# Patient Record
Sex: FEMALE | Race: WHITE | HISPANIC OR LATINO | Employment: OTHER | URBAN - METROPOLITAN AREA
[De-identification: names, ages, dates, MRNs, and addresses within clinical notes are randomized per-mention and may not be internally consistent; named-entity substitution may affect disease eponyms.]

---

## 2018-02-21 ENCOUNTER — OFFICE VISIT (OUTPATIENT)
Dept: PRIMARY CARE CLINIC | Facility: CLINIC | Age: 28
End: 2018-02-21
Payer: MEDICAID

## 2018-02-21 VITALS
RESPIRATION RATE: 18 BRPM | OXYGEN SATURATION: 99 % | TEMPERATURE: 98 F | BODY MASS INDEX: 43.78 KG/M2 | WEIGHT: 223 LBS | HEIGHT: 60 IN | SYSTOLIC BLOOD PRESSURE: 99 MMHG | HEART RATE: 53 BPM | DIASTOLIC BLOOD PRESSURE: 68 MMHG

## 2018-02-21 DIAGNOSIS — S16.1XXA STRAIN OF NECK MUSCLE, INITIAL ENCOUNTER: Primary | ICD-10-CM

## 2018-02-21 DIAGNOSIS — I95.9 HYPOTENSION, UNSPECIFIED HYPOTENSION TYPE: ICD-10-CM

## 2018-02-21 DIAGNOSIS — M62.9 MUSCULOSKELETAL DISORDER INVOLVING UPPER TRAPEZIUS MUSCLE: ICD-10-CM

## 2018-02-21 DIAGNOSIS — E66.3 OVERWEIGHT: ICD-10-CM

## 2018-02-21 DIAGNOSIS — J45.909 ASTHMA, UNSPECIFIED ASTHMA SEVERITY, UNSPECIFIED WHETHER COMPLICATED, UNSPECIFIED WHETHER PERSISTENT: ICD-10-CM

## 2018-02-21 PROCEDURE — 3008F BODY MASS INDEX DOCD: CPT | Mod: ,,, | Performed by: FAMILY MEDICINE

## 2018-02-21 PROCEDURE — 99999 PR PBB SHADOW E&M-NEW PATIENT-LVL IV: CPT | Mod: PBBFAC,,, | Performed by: FAMILY MEDICINE

## 2018-02-21 PROCEDURE — 99204 OFFICE O/P NEW MOD 45 MIN: CPT | Mod: PBBFAC,PN | Performed by: FAMILY MEDICINE

## 2018-02-21 PROCEDURE — 99203 OFFICE O/P NEW LOW 30 MIN: CPT | Mod: S$PBB,,, | Performed by: FAMILY MEDICINE

## 2018-02-21 RX ORDER — DICLOFENAC SODIUM 75 MG/1
75 TABLET, DELAYED RELEASE ORAL 2 TIMES DAILY
Qty: 60 TABLET | Refills: 3 | Status: SHIPPED | OUTPATIENT
Start: 2018-02-21 | End: 2018-04-05

## 2018-02-21 RX ORDER — METHYLPREDNISOLONE 4 MG/1
TABLET ORAL
Qty: 1 PACKAGE | Refills: 0 | Status: SHIPPED | OUTPATIENT
Start: 2018-02-21 | End: 2018-04-05

## 2018-02-21 RX ORDER — CYCLOBENZAPRINE HCL 10 MG
10 TABLET ORAL 3 TIMES DAILY PRN
Qty: 30 TABLET | Refills: 5 | Status: SHIPPED | OUTPATIENT
Start: 2018-02-21 | End: 2018-03-03

## 2018-02-21 NOTE — PROGRESS NOTES
Subjective:       Patient ID: Rivka Moreau is a 28 y.o. female.    Chief Complaint: Neck Pain (want blood work and BP check )    HPI: 27 yo WF c/o pain left side neck. Started yesterday AM --woke up with stiff neck. Went to gym to lift weighs after waking up with sore neck thinks may have made it worse . No hx trauma no excessive activity + hx heartburn. No lupus, rheumatoid arthritis, gout, fx or arthritis.     ROS:  Skin: no psoriasis, eczema, skin cancer tatooes on arms   HEENT: + headache only left side head , no  ocular pain, blurred vision, diplopia, epistaxis, hoarseness change in voice, thyroid trouble  Lung: No pneumonia,+ asthma has inhaler, no  Tb, wheezing, SOB,  Heart: No chest pain, ankle edema, palpitations, MI, nathan murmur, hypertension, hyperlipidemia-- has feeling BP low checked with BP machine [facility] [place] 90/52. When goes long time without eating get hypoglycemic and BP goes down.   Abdomen: No nausea, vomiting, diarrhea, constipation, ulcers, hepatitis, gallbladder disease, melena, hematochezia, hematemesis  : no UTI, renal disease, stones  MS: no fractures, O/A, lupus, rheumatoid, gout  Neuro: No dizziness, LOC, seizures   No diabetes, no anemia, + anxiety pt does not want to take anxiety medication, no depression  Engaged , 2 children , lives with fiance and 2 children work [employer]      Objective:   Physical Exam:  General: Well nourished, well developed, no acute distress + overweight  Skin: tatooes on forearms bilat   HEENT: Eyes PERRLA, EOM intact, nose patent, throat non-erythematous   NECK: Supple, no bruits, No JVD, no nodes  Lungs: Clear, no rales, rhonchi, wheezing  Heart: Regular rate and rhythm, no murmurs, gallops, or rubs  Abdomen: flat, bowel sounds positive, no tenderness, or organomegaly  MS: Tenderness cervical spine C3-7 and left upper trapezius muscle on palpation.  Pain with lateral flexion and rotation to the right with spasm of the left paravertebral muscles  of left upper trapezius muscle.  Pain with raising arms overhead but full range of motion muscle strength  Neuro: Alert, CN intact, oriented X 3  Extremities: No cyanosis, clubbing, or edema         Assessment:       1. Strain of neck muscle, initial encounter    2. Musculoskeletal disorder involving upper trapezius muscle    3. Hypotension, unspecified hypotension type    4. Overweight    5. Asthma, unspecified asthma severity, unspecified whether complicated, unspecified whether persistent        Plan:       Strain of neck muscle, initial encounter  -     X-Ray Cervical Spine Complete 5 view; Future; Expected date: 02/21/2018    Musculoskeletal disorder involving upper trapezius muscle    Hypotension, unspecified hypotension type  -     CBC auto differential; Future; Expected date: 02/21/2018  -     Comprehensive metabolic panel; Future; Expected date: 02/21/2018  -     EKG 12-lead; Future  -     Lipid panel; Future; Expected date: 02/21/2018  -     X-Ray Chest PA And Lateral; Future; Expected date: 02/21/2018  -     Urinalysis  -     T4, free; Future; Expected date: 02/21/2018  -     TSH; Future; Expected date: 02/21/2018    Overweight  -     CBC auto differential; Future; Expected date: 02/21/2018  -     Comprehensive metabolic panel; Future; Expected date: 02/21/2018  -     EKG 12-lead; Future  -     Lipid panel; Future; Expected date: 02/21/2018  -     X-Ray Chest PA And Lateral; Future; Expected date: 02/21/2018  -     Urinalysis  -     T4, free; Future; Expected date: 02/21/2018  -     TSH; Future; Expected date: 02/21/2018    Asthma, unspecified asthma severity, unspecified whether complicated, unspecified whether persistent    Other orders  -     methylPREDNISolone (MEDROL DOSEPACK) 4 mg tablet; use as directed  Dispense: 1 Package; Refill: 0  -     cyclobenzaprine (FLEXERIL) 10 MG tablet; Take 1 tablet (10 mg total) by mouth 3 (three) times daily as needed.  Dispense: 30 tablet; Refill: 5  -      diclofenac (VOLTAREN) 75 MG EC tablet; Take 1 tablet (75 mg total) by mouth 2 (two) times daily.  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

## 2018-03-06 ENCOUNTER — CLINICAL SUPPORT (OUTPATIENT)
Dept: PRIMARY CARE CLINIC | Facility: CLINIC | Age: 28
End: 2018-03-06
Payer: MEDICAID

## 2018-03-06 DIAGNOSIS — I95.9 HYPOTENSION, UNSPECIFIED HYPOTENSION TYPE: ICD-10-CM

## 2018-03-06 DIAGNOSIS — E66.3 OVERWEIGHT: ICD-10-CM

## 2018-03-06 LAB
ALBUMIN SERPL BCP-MCNC: 3.6 G/DL
ALP SERPL-CCNC: 80 U/L
ALT SERPL W/O P-5'-P-CCNC: 8 U/L
ANION GAP SERPL CALC-SCNC: 6 MMOL/L
AST SERPL-CCNC: 12 U/L
BASOPHILS # BLD AUTO: 0.03 K/UL
BASOPHILS NFR BLD: 0.4 %
BILIRUB SERPL-MCNC: 0.3 MG/DL
BUN SERPL-MCNC: 11 MG/DL
CALCIUM SERPL-MCNC: 9 MG/DL
CHLORIDE SERPL-SCNC: 109 MMOL/L
CHOLEST SERPL-MCNC: 133 MG/DL
CHOLEST/HDLC SERPL: 2.2 {RATIO}
CO2 SERPL-SCNC: 26 MMOL/L
CREAT SERPL-MCNC: 0.8 MG/DL
DIFFERENTIAL METHOD: NORMAL
EOSINOPHIL # BLD AUTO: 0.4 K/UL
EOSINOPHIL NFR BLD: 5 %
ERYTHROCYTE [DISTWIDTH] IN BLOOD BY AUTOMATED COUNT: 13.8 %
EST. GFR  (AFRICAN AMERICAN): >60 ML/MIN/1.73 M^2
EST. GFR  (NON AFRICAN AMERICAN): >60 ML/MIN/1.73 M^2
GLUCOSE SERPL-MCNC: 95 MG/DL
HCT VFR BLD AUTO: 39.9 %
HDLC SERPL-MCNC: 61 MG/DL
HDLC SERPL: 45.9 %
HGB BLD-MCNC: 13.2 G/DL
IMM GRANULOCYTES # BLD AUTO: 0.03 K/UL
IMM GRANULOCYTES NFR BLD AUTO: 0.4 %
LDLC SERPL CALC-MCNC: 58.6 MG/DL
LYMPHOCYTES # BLD AUTO: 2.1 K/UL
LYMPHOCYTES NFR BLD: 24.2 %
MCH RBC QN AUTO: 29.5 PG
MCHC RBC AUTO-ENTMCNC: 33.1 G/DL
MCV RBC AUTO: 89 FL
MONOCYTES # BLD AUTO: 0.6 K/UL
MONOCYTES NFR BLD: 6.4 %
NEUTROPHILS # BLD AUTO: 5.4 K/UL
NEUTROPHILS NFR BLD: 63.6 %
NONHDLC SERPL-MCNC: 72 MG/DL
NRBC BLD-RTO: 0 /100 WBC
PLATELET # BLD AUTO: 203 K/UL
PMV BLD AUTO: 12.3 FL
POTASSIUM SERPL-SCNC: 4.4 MMOL/L
PROT SERPL-MCNC: 7 G/DL
RBC # BLD AUTO: 4.48 M/UL
SODIUM SERPL-SCNC: 141 MMOL/L
T4 FREE SERPL-MCNC: 0.97 NG/DL
TRIGL SERPL-MCNC: 67 MG/DL
TSH SERPL DL<=0.005 MIU/L-ACNC: 1.26 UIU/ML
WBC # BLD AUTO: 8.54 K/UL

## 2018-03-06 PROCEDURE — 85025 COMPLETE CBC W/AUTO DIFF WBC: CPT

## 2018-03-06 PROCEDURE — 80053 COMPREHEN METABOLIC PANEL: CPT

## 2018-03-06 PROCEDURE — 99999 PR PBB SHADOW E&M-EST. PATIENT-LVL II: CPT | Mod: PBBFAC,,,

## 2018-03-06 PROCEDURE — 84439 ASSAY OF FREE THYROXINE: CPT

## 2018-03-06 PROCEDURE — 99212 OFFICE O/P EST SF 10 MIN: CPT | Mod: PBBFAC,PN

## 2018-03-06 PROCEDURE — 80061 LIPID PANEL: CPT

## 2018-03-06 PROCEDURE — 84443 ASSAY THYROID STIM HORMONE: CPT

## 2018-03-06 NOTE — PROGRESS NOTES
Pt ID verified by Name and . EKG done per MD order. Pt tolerated well. Result signed off by Dr. Reyes.

## 2018-04-05 ENCOUNTER — OFFICE VISIT (OUTPATIENT)
Dept: PRIMARY CARE CLINIC | Facility: CLINIC | Age: 28
End: 2018-04-05
Payer: MEDICAID

## 2018-04-05 VITALS
RESPIRATION RATE: 18 BRPM | BODY MASS INDEX: 44.37 KG/M2 | DIASTOLIC BLOOD PRESSURE: 65 MMHG | TEMPERATURE: 98 F | SYSTOLIC BLOOD PRESSURE: 96 MMHG | HEIGHT: 60 IN | OXYGEN SATURATION: 100 % | WEIGHT: 226 LBS | HEART RATE: 62 BPM

## 2018-04-05 DIAGNOSIS — J45.909 ASTHMA, UNSPECIFIED ASTHMA SEVERITY, UNSPECIFIED WHETHER COMPLICATED, UNSPECIFIED WHETHER PERSISTENT: Primary | ICD-10-CM

## 2018-04-05 DIAGNOSIS — I95.9 HYPOTENSION, UNSPECIFIED HYPOTENSION TYPE: ICD-10-CM

## 2018-04-05 DIAGNOSIS — J32.9 SINUSITIS, UNSPECIFIED CHRONICITY, UNSPECIFIED LOCATION: ICD-10-CM

## 2018-04-05 DIAGNOSIS — E66.3 OVERWEIGHT: ICD-10-CM

## 2018-04-05 DIAGNOSIS — J40 BRONCHITIS: ICD-10-CM

## 2018-04-05 PROCEDURE — 99213 OFFICE O/P EST LOW 20 MIN: CPT | Mod: S$PBB,,, | Performed by: FAMILY MEDICINE

## 2018-04-05 PROCEDURE — 99213 OFFICE O/P EST LOW 20 MIN: CPT | Mod: PBBFAC,PN | Performed by: FAMILY MEDICINE

## 2018-04-05 PROCEDURE — 99999 PR PBB SHADOW E&M-EST. PATIENT-LVL III: CPT | Mod: PBBFAC,,, | Performed by: FAMILY MEDICINE

## 2018-04-05 RX ORDER — PROMETHAZINE HYDROCHLORIDE AND CODEINE PHOSPHATE 6.25; 1 MG/5ML; MG/5ML
5 SOLUTION ORAL EVERY 6 HOURS PRN
Qty: 180 ML | Refills: 0 | Status: SHIPPED | OUTPATIENT
Start: 2018-04-05 | End: 2018-08-16

## 2018-04-05 RX ORDER — METHYLPREDNISOLONE 4 MG/1
TABLET ORAL
Qty: 1 PACKAGE | Refills: 0 | Status: SHIPPED | OUTPATIENT
Start: 2018-04-05 | End: 2018-04-26

## 2018-04-05 RX ORDER — AZITHROMYCIN 250 MG/1
TABLET, FILM COATED ORAL
Qty: 6 TABLET | Refills: 0 | Status: SHIPPED | OUTPATIENT
Start: 2018-04-05 | End: 2018-04-09

## 2018-04-05 RX ORDER — ALBUTEROL SULFATE 90 UG/1
2 AEROSOL, METERED RESPIRATORY (INHALATION) EVERY 4 HOURS PRN
Qty: 1 INHALER | Refills: 5 | Status: SHIPPED | OUTPATIENT
Start: 2018-04-05 | End: 2018-08-16

## 2018-04-05 NOTE — PROGRESS NOTES
Subjective:       Patient ID: Rivka Moreau is a 28 y.o. female.    Chief Complaint: Sore Throat and Headache    HPI:29 yo WF in for cold -- sick x 2 days-- no fever, + runny nose, + sor eth, + cough, +phklkegm -- green, alot. + asthma + wheezing inhaler , No P.TB, No smoking   ROS:  Skin: no psoriasis, eczema, skin cancer  HEENT: + headache frontal , no  ocular pain, blurred vision, diplopia, epistaxis,+ hoarseness+ change in voice,no  thyroid trouble  Lung: No pneumonia,  Tb, +wheezing, SOB, + asthma, no smoking  Heart: No chest pain, ankle edema, palpitations, MI, nathan murmur, hypertension, hyperlipidemia-- hx hyopotension  Abdomen: No nausea, vomiting, diarrhea, constipation, ulcers, hepatitis, gallbladder disease, melena, hematochezia, hematemesis  : no UTI, renal disease, stones   Gyn LMP 3 weeks PAP last year   MS: no fractures, O/A, lupus, rheumatoid, gout  Neuro: No dizziness, LOC, seizures   No diabetes, no anemia, no anxiety, no depression   Engaged, 2 children, 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 Asthma, unspecified asthma severity, unspecified whether complicated, unspecified whether persistent    2. Bronchitis    3. Sinusitis, unspecified chronicity, unspecified location    4. Hypotension, unspecified hypotension type    5. Overweight        Plan:       Asthma, unspecified asthma severity, unspecified  whether complicated, unspecified whether persistent    Bronchitis    Sinusitis, unspecified chronicity, unspecified location    Hypotension, unspecified hypotension type    Overweight      Pt with asthma needs new inhaler  Cold tx zith, medrol, phenergan with codiene

## 2018-08-16 ENCOUNTER — OFFICE VISIT (OUTPATIENT)
Dept: PRIMARY CARE CLINIC | Facility: CLINIC | Age: 28
End: 2018-08-16
Payer: MEDICAID

## 2018-08-16 ENCOUNTER — TELEPHONE (OUTPATIENT)
Dept: PRIMARY CARE CLINIC | Facility: CLINIC | Age: 28
End: 2018-08-16

## 2018-08-16 VITALS
SYSTOLIC BLOOD PRESSURE: 107 MMHG | BODY MASS INDEX: 43.78 KG/M2 | WEIGHT: 223 LBS | DIASTOLIC BLOOD PRESSURE: 66 MMHG | OXYGEN SATURATION: 56 % | HEIGHT: 60 IN | RESPIRATION RATE: 18 BRPM | HEART RATE: 56 BPM

## 2018-08-16 DIAGNOSIS — S16.1XXA STRAIN OF NECK MUSCLE, INITIAL ENCOUNTER: ICD-10-CM

## 2018-08-16 DIAGNOSIS — J45.909 ASTHMA, UNSPECIFIED ASTHMA SEVERITY, UNSPECIFIED WHETHER COMPLICATED, UNSPECIFIED WHETHER PERSISTENT: ICD-10-CM

## 2018-08-16 DIAGNOSIS — M62.9 MUSCULOSKELETAL DISORDER INVOLVING UPPER TRAPEZIUS MUSCLE: Primary | ICD-10-CM

## 2018-08-16 DIAGNOSIS — F41.9 ANXIETY: ICD-10-CM

## 2018-08-16 DIAGNOSIS — E66.01 MORBID OBESITY WITH BMI OF 40.0-44.9, ADULT: ICD-10-CM

## 2018-08-16 PROBLEM — E66.3 OVERWEIGHT: Status: RESOLVED | Noted: 2018-02-21 | Resolved: 2018-08-16

## 2018-08-16 PROCEDURE — 99213 OFFICE O/P EST LOW 20 MIN: CPT | Mod: S$PBB,,, | Performed by: FAMILY MEDICINE

## 2018-08-16 PROCEDURE — 99999 PR PBB SHADOW E&M-EST. PATIENT-LVL III: CPT | Mod: PBBFAC,,, | Performed by: FAMILY MEDICINE

## 2018-08-16 PROCEDURE — 99213 OFFICE O/P EST LOW 20 MIN: CPT | Mod: PBBFAC,PN | Performed by: FAMILY MEDICINE

## 2018-08-16 RX ORDER — LORAZEPAM 0.5 MG/1
TABLET ORAL
Qty: 30 TABLET | Refills: 1 | Status: SHIPPED | OUTPATIENT
Start: 2018-08-16 | End: 2019-01-02

## 2018-08-16 RX ORDER — METHYLPREDNISOLONE 4 MG/1
TABLET ORAL
Qty: 1 PACKAGE | Refills: 0 | Status: SHIPPED | OUTPATIENT
Start: 2018-08-16 | End: 2018-09-06

## 2018-08-16 RX ORDER — ALBUTEROL SULFATE 90 UG/1
2 AEROSOL, METERED RESPIRATORY (INHALATION) EVERY 4 HOURS PRN
Qty: 1 INHALER | Refills: 5 | Status: SHIPPED | OUTPATIENT
Start: 2018-08-16 | End: 2019-01-02

## 2018-08-16 RX ORDER — IBUPROFEN 600 MG/1
600 TABLET ORAL 3 TIMES DAILY
Qty: 60 TABLET | Refills: 5 | Status: SHIPPED | OUTPATIENT
Start: 2018-08-16 | End: 2019-01-02

## 2018-08-16 RX ORDER — AZITHROMYCIN 250 MG/1
TABLET, FILM COATED ORAL
Qty: 6 TABLET | Refills: 0 | Status: SHIPPED | OUTPATIENT
Start: 2018-08-16 | End: 2018-08-20

## 2018-08-16 RX ORDER — BETAMETHASONE SODIUM PHOSPHATE AND BETAMETHASONE ACETATE 3; 3 MG/ML; MG/ML
12 INJECTION, SUSPENSION INTRA-ARTICULAR; INTRALESIONAL; INTRAMUSCULAR; SOFT TISSUE
Status: DISCONTINUED | OUTPATIENT
Start: 2018-08-16 | End: 2019-01-02

## 2018-08-16 RX ORDER — PROPRANOLOL HYDROCHLORIDE 10 MG/1
TABLET ORAL
Qty: 60 TABLET | Refills: 5 | Status: SHIPPED | OUTPATIENT
Start: 2018-08-16 | End: 2019-01-02

## 2018-08-16 NOTE — PROGRESS NOTES
Patient ID by name and . Celestone 12 mg IM injection ordered per physicians order. Patient refused.

## 2018-08-16 NOTE — TELEPHONE ENCOUNTER
----- Message from Romi Birmingham sent at 8/16/2018 10:16 AM CDT -----  Type:  Same Day Appointment Request    Caller is requesting a same day appointment.  Caller declined first available appointment listed below.      Name of Caller:  Patient   When is the first available appointment?  08/17/18  Symptoms:  Check bp states 138/94 today  Best Call Back Number:  [phone]  Additional Information:

## 2018-08-16 NOTE — PROGRESS NOTES
Subjective:       Patient ID: Rivka Moreau is a 28 y.o. female.    Chief Complaint: Hypertension (138/94)    HPI:  28-year-old female in for high blood pressure--blood pressure 138/94--patient was seen at the chiropractor today--problem with left upper trapezius muscle--patient is a hairdresser.  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 and has to raise arms up to work on scalp cyst has to keep elbows laterally and extended and aggravates in the neck patient has only had low blood pressure in the past       Patient with a history of hoarseness/slight wheezing/felt was secondary to allergy    ROS:  Skin: no psoriasis, eczema, skin cancer   HEENT: No headache, ocular pain, blurred vision, diplopia, epistaxis, +hoarsenessno  change in voice, thyroid trouble  Lung: No pneumonia, asthma, Tb, + slight wheezing, SOB,  Heart: No chest pain, ankle edema, palpitations, MI, nathan murmur, hypertension, hyperlipidemia--blood pressure has always been low  Abdomen: No nausea, vomiting, diarrhea, constipation, ulcers, hepatitis, gallbladder disease, melena, hematochezia, hematemesis  : no UTI, renal disease, stones  MS: no fractures, O/A, lupus, rheumatoid, gout + left upper trapezius pain  Neuro: No dizziness, LOC, seizures   No diabetes, no anemia, +anxiety--occasionally with work-mainly work, no depression   , 2 children, 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 anxiety with standing in front classes--heart starts beating fast, handshake---mother with rheumatoid arthritis, father diabetes, OK with in laws, finances okay, sex OK Brother could be more responsible Pt worries about him    Objective:   Physical Exam:  General: Well nourished, well developed, no acute distress + obese   Skin: No lesions  HEENT: Eyes PERRLA, EOM intact, nose patent, throat non-erythematous    NECK: Supple, no bruits, No JVD, no nodes  Lungs: Clear, no rales, rhonchi, wheezing  Heart: Regular rate and rhythm, no  murmurs, gallops, or rubs  Abdomen: flat, bowel sounds positive, no tenderness, or organomegaly  MS:  Tenderness in cervical spine C3 through 7 left upper trapezius muscle with palpation pain with lateral flexion rotation of the neck with raising arms overhead--main problem is patient works with arms at about 90° elbows and flexion with hands 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 Musculoskeletal disorder involving upper trapezius muscle    2. Strain of neck muscle, initial encounter    3. Anxiety    4. Morbid obesity with BMI of 40.0-44.9, adult    5. Asthma, unspecified asthma severity, unspecified whether complicated, unspecified whether persistent        Plan:       Musculoskeletal disorder involving upper trapezius muscle    Strain of neck muscle, initial encounter    Anxiety    Morbid obesity with BMI of 40.0-44.9, adult    Asthma, unspecified asthma severity, unspecified whether complicated, unspecified whether persistent      patient needs to get an arm blood pressure cuff check blood pressure daily--if blood pressure greater than 140 systolic or 90 diastolic call for blood pressure medications  Due to difficulty speaking in front of people history of questionable hypertension and anxiety issues with palpitations will give trial of Inderal 10 mg once if blood pressure stable will increase to b.i.d. patient should use arm blood pressure cuff if blood pressure less than 100 systolic over 60 diastolic knee not take Inderal will try to increase dose to about 60 mg per day if possible for maximum effect on palpitations anxiety and States frieght  Patient needs to exercise decrease weight has morbid obesity should consider possibly gastric sleeve if unable to lose weight with diet and exercise  Has asthma/hoarseness--Zithromax  Due to pain in the neck and shoulder trial of  Edith own/Medrol/ibuprofen  If wheezing needs albuterol inhaler  Anxiety trial of Ativan 0.5 once a day p.r.n. anxiety but may be able to use just the Inderal  LMP 2 weeks ago  Zithromax Celestone in Medrol for cold, Celestone Medrol ibuprofen for neck and shoulder, albuterol for wheezing, Ativan for anxiety, Inderal for palpitations anxiety stage fright blood pressure needs arm blood pressure cuff

## 2018-11-09 LAB — HIV 1+2 AB+HIV1 P24 AG SERPL QL IA: NEGATIVE

## 2019-01-02 ENCOUNTER — OFFICE VISIT (OUTPATIENT)
Dept: PRIMARY CARE CLINIC | Facility: CLINIC | Age: 29
End: 2019-01-02
Payer: MEDICAID

## 2019-01-02 VITALS
RESPIRATION RATE: 18 BRPM | SYSTOLIC BLOOD PRESSURE: 107 MMHG | HEART RATE: 69 BPM | OXYGEN SATURATION: 98 % | BODY MASS INDEX: 33.19 KG/M2 | WEIGHT: 219 LBS | DIASTOLIC BLOOD PRESSURE: 72 MMHG | HEIGHT: 68 IN

## 2019-01-02 DIAGNOSIS — I95.9 HYPOTENSION, UNSPECIFIED HYPOTENSION TYPE: ICD-10-CM

## 2019-01-02 DIAGNOSIS — J40 BRONCHITIS: ICD-10-CM

## 2019-01-02 DIAGNOSIS — M25.511 ACUTE PAIN OF RIGHT SHOULDER: ICD-10-CM

## 2019-01-02 DIAGNOSIS — J32.9 SINUSITIS, UNSPECIFIED CHRONICITY, UNSPECIFIED LOCATION: Primary | ICD-10-CM

## 2019-01-02 DIAGNOSIS — J45.909 ASTHMA, UNSPECIFIED ASTHMA SEVERITY, UNSPECIFIED WHETHER COMPLICATED, UNSPECIFIED WHETHER PERSISTENT: ICD-10-CM

## 2019-01-02 DIAGNOSIS — I45.10 INCOMPLETE RIGHT BUNDLE BRANCH BLOCK: ICD-10-CM

## 2019-01-02 PROBLEM — M47.816 OSTEOARTHRITIS OF LUMBAR SPINE: Status: ACTIVE | Noted: 2019-01-02

## 2019-01-02 PROCEDURE — 99213 PR OFFICE/OUTPT VISIT, EST, LEVL III, 20-29 MIN: ICD-10-PCS | Mod: S$PBB,,, | Performed by: FAMILY MEDICINE

## 2019-01-02 PROCEDURE — 99999 PR PBB SHADOW E&M-EST. PATIENT-LVL III: ICD-10-PCS | Mod: PBBFAC,,, | Performed by: FAMILY MEDICINE

## 2019-01-02 PROCEDURE — 99999 PR PBB SHADOW E&M-EST. PATIENT-LVL III: CPT | Mod: PBBFAC,,, | Performed by: FAMILY MEDICINE

## 2019-01-02 PROCEDURE — 99213 OFFICE O/P EST LOW 20 MIN: CPT | Mod: S$PBB,,, | Performed by: FAMILY MEDICINE

## 2019-01-02 PROCEDURE — 99213 OFFICE O/P EST LOW 20 MIN: CPT | Mod: PBBFAC,PN | Performed by: FAMILY MEDICINE

## 2019-01-02 RX ORDER — AZITHROMYCIN 250 MG/1
TABLET, FILM COATED ORAL
Qty: 6 TABLET | Refills: 0 | Status: SHIPPED | OUTPATIENT
Start: 2019-01-02 | End: 2019-01-06

## 2019-01-02 RX ORDER — METHYLPREDNISOLONE 4 MG/1
TABLET ORAL
Qty: 1 PACKAGE | Refills: 0 | Status: SHIPPED | OUTPATIENT
Start: 2019-01-02 | End: 2019-04-10

## 2019-01-02 RX ORDER — CYCLOBENZAPRINE HCL 10 MG
10 TABLET ORAL 3 TIMES DAILY PRN
Qty: 30 TABLET | Refills: 5 | Status: SHIPPED | OUTPATIENT
Start: 2019-01-02 | End: 2019-01-12

## 2019-01-02 RX ORDER — PROMETHAZINE HYDROCHLORIDE AND CODEINE PHOSPHATE 6.25; 1 MG/5ML; MG/5ML
5 SOLUTION ORAL EVERY 6 HOURS PRN
Qty: 180 ML | Refills: 0 | Status: SHIPPED | OUTPATIENT
Start: 2019-01-02 | End: 2019-04-10

## 2019-01-02 RX ORDER — IBUPROFEN 600 MG/1
TABLET ORAL
Qty: 100 TABLET | Refills: 5 | Status: SHIPPED | OUTPATIENT
Start: 2019-01-02 | End: 2019-04-10

## 2019-01-02 RX ORDER — ALBUTEROL SULFATE 90 UG/1
2 AEROSOL, METERED RESPIRATORY (INHALATION) EVERY 4 HOURS PRN
Qty: 1 INHALER | Refills: 5 | Status: SHIPPED | OUTPATIENT
Start: 2019-01-02 | End: 2019-04-10

## 2019-01-02 RX ORDER — BETAMETHASONE SODIUM PHOSPHATE AND BETAMETHASONE ACETATE 3; 3 MG/ML; MG/ML
12 INJECTION, SUSPENSION INTRA-ARTICULAR; INTRALESIONAL; INTRAMUSCULAR; SOFT TISSUE
Status: DISCONTINUED | OUTPATIENT
Start: 2019-01-02 | End: 2019-04-10

## 2019-01-02 NOTE — PROGRESS NOTES
Subjective:       Patient ID: Rivka Moreau is a 28 y.o. female.    Chief Complaint: Cough (congestion); Back Pain; and Sore Throat    HPI:27 yo WF in for cold -- sick x 2 days-- no fever, + runny nose, + sore th, + cough, + phlegm -- green and clear--+hoarseness + asthma + wheezing has been using inhaler No P.TB, No smoking       Complaining of back pain---cardiology took an x-ray of the patient's back--history of hypotension.  Patient had an echocardiogram/24 Holter -no stress test--EKG showed an incomplete right bundle branch block x-ray of the lumbar spine showed osteoarthritis right L5-S1 area.  Burning sensation about the in for scapular area probably T8.  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 probable irritating the left scapula at the in for scapular border could be affecting the trapezius muscle or the latissimus dorsi or rhomboid muscles on the left in for scapular area around T8   ROS:  Skin: no psoriasis, eczema, skin cancer  HEENT: + headache frontal , no  ocular pain, blurred vision, diplopia, epistaxis,+ hoarseness+ change in voice,no  thyroid trouble  Lung: No pneumonia,  Tb, +wheezing, SOB, + asthma, no smoking  Heart: No chest pain, ankle edema, palpitations, MI, nathan murmur, hypertension, hyperlipidemia-- hx hyopotension  Abdomen: No nausea, vomiting, diarrhea, constipation, ulcers, hepatitis, gallbladder disease, melena, hematochezia, hematemesis  : no UTI, renal disease, stones   Gyn LMP 3 weeks PAP last year   MS: no fractures, O/A, lupus, rheumatoid, gout  Neuro: No dizziness, LOC, seizures   No diabetes, no anemia, no anxiety, no depression   Engaged, 2 children, 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     Objective:   Physical Exam:  General: Well nourished, well developed, no acute distress + overweight   Skin: No lesions  HEENT: Eyes PERRLA, EOM intact, nose cl D/C , throat  +1/4 erythematous ears TM cl   NECK: Supple, no bruits, No JVD, no  nodes  Lungs: Clear, no rales, rhonchi, wheezing +coarse cough  Heart: Regular rate and rhythm, no murmurs, gallops, or rubs  Abdomen: flat, bowel sounds positive, no tenderness, or organomegaly  MS: Range of motion and muscle strength intact --tenderness left scapular area especially the in for scapular portion around T8 pain with anterior posterior flexion shoulders pain with raising arms overhead pain in the left upper trapezius lateral flexion neck to the right sore with raising arms overhead  Neuro: Alert, CN intact, oriented X 3  Extremities: No cyanosis, clubbing, or edema         Assessment:       1. Sinusitis, unspecified chronicity, unspecified location    2. Bronchitis    3. Hypotension, unspecified hypotension type    4. Asthma, unspecified asthma severity, unspecified whether complicated, unspecified whether persistent        Plan:       Sinusitis, unspecified chronicity, unspecified location    Bronchitis    Hypotension, unspecified hypotension type    Asthma, unspecified asthma severity, unspecified whether complicated, unspecified whether persistent       Cold tx zith/Medrol/Phenergan with coat/ celestone   Use albuterol inhaler p.r.n.  Ibuprofen/Flexeril once off steroids--Moist heat/range of motion exercise/Theragesic  If pain stays x-ray T-spine in left shoulder--patient however is  pain appears to be musculoskeletal--states does appear to occur in the bra line--may benefit from an athletic bra

## 2019-04-10 ENCOUNTER — OFFICE VISIT (OUTPATIENT)
Dept: PRIMARY CARE CLINIC | Facility: CLINIC | Age: 29
End: 2019-04-10
Payer: MEDICAID

## 2019-04-10 ENCOUNTER — TELEPHONE (OUTPATIENT)
Dept: PRIMARY CARE CLINIC | Facility: CLINIC | Age: 29
End: 2019-04-10

## 2019-04-10 VITALS
SYSTOLIC BLOOD PRESSURE: 105 MMHG | BODY MASS INDEX: 33.49 KG/M2 | OXYGEN SATURATION: 99 % | HEART RATE: 73 BPM | RESPIRATION RATE: 18 BRPM | HEIGHT: 68 IN | TEMPERATURE: 98 F | WEIGHT: 221 LBS | DIASTOLIC BLOOD PRESSURE: 72 MMHG

## 2019-04-10 DIAGNOSIS — J45.909 ASTHMA, UNSPECIFIED ASTHMA SEVERITY, UNSPECIFIED WHETHER COMPLICATED, UNSPECIFIED WHETHER PERSISTENT: ICD-10-CM

## 2019-04-10 DIAGNOSIS — J32.9 SINUSITIS, UNSPECIFIED CHRONICITY, UNSPECIFIED LOCATION: Primary | ICD-10-CM

## 2019-04-10 DIAGNOSIS — J98.01 BRONCHOSPASM: ICD-10-CM

## 2019-04-10 DIAGNOSIS — J40 BRONCHITIS: ICD-10-CM

## 2019-04-10 DIAGNOSIS — I45.10 INCOMPLETE RIGHT BUNDLE BRANCH BLOCK: ICD-10-CM

## 2019-04-10 DIAGNOSIS — I95.9 HYPOTENSION, UNSPECIFIED HYPOTENSION TYPE: ICD-10-CM

## 2019-04-10 DIAGNOSIS — E66.9 CLASS 1 OBESITY WITH BODY MASS INDEX (BMI) OF 33.0 TO 33.9 IN ADULT, UNSPECIFIED OBESITY TYPE, UNSPECIFIED WHETHER SERIOUS COMORBIDITY PRESENT: ICD-10-CM

## 2019-04-10 PROBLEM — E66.811 CLASS 1 OBESITY WITH BODY MASS INDEX (BMI) OF 33.0 TO 33.9 IN ADULT: Status: ACTIVE | Noted: 2019-04-10

## 2019-04-10 LAB
CTP QC/QA: YES
FLUAV AG NPH QL: NEGATIVE
FLUBV AG NPH QL: NEGATIVE

## 2019-04-10 PROCEDURE — 99214 OFFICE O/P EST MOD 30 MIN: CPT | Mod: S$PBB,,, | Performed by: FAMILY MEDICINE

## 2019-04-10 PROCEDURE — 96372 THER/PROPH/DIAG INJ SC/IM: CPT | Mod: PBBFAC,PN

## 2019-04-10 PROCEDURE — 99999 PR PBB SHADOW E&M-EST. PATIENT-LVL III: CPT | Mod: PBBFAC,,, | Performed by: FAMILY MEDICINE

## 2019-04-10 PROCEDURE — 99213 OFFICE O/P EST LOW 20 MIN: CPT | Mod: PBBFAC,PN | Performed by: FAMILY MEDICINE

## 2019-04-10 PROCEDURE — 99999 PR PBB SHADOW E&M-EST. PATIENT-LVL III: ICD-10-PCS | Mod: PBBFAC,,, | Performed by: FAMILY MEDICINE

## 2019-04-10 PROCEDURE — 99214 PR OFFICE/OUTPT VISIT, EST, LEVL IV, 30-39 MIN: ICD-10-PCS | Mod: S$PBB,,, | Performed by: FAMILY MEDICINE

## 2019-04-10 PROCEDURE — 87804 INFLUENZA ASSAY W/OPTIC: CPT | Mod: PBBFAC,PN | Performed by: FAMILY MEDICINE

## 2019-04-10 RX ORDER — PROMETHAZINE HYDROCHLORIDE AND CODEINE PHOSPHATE 6.25; 1 MG/5ML; MG/5ML
5 SOLUTION ORAL EVERY 6 HOURS PRN
Qty: 180 ML | Refills: 0 | OUTPATIENT
Start: 2019-04-10 | End: 2020-11-23

## 2019-04-10 RX ORDER — ALBUTEROL SULFATE 90 UG/1
2 AEROSOL, METERED RESPIRATORY (INHALATION) EVERY 4 HOURS PRN
Qty: 1 INHALER | Refills: 5 | Status: SHIPPED | OUTPATIENT
Start: 2019-04-10 | End: 2020-02-18 | Stop reason: SDUPTHER

## 2019-04-10 RX ORDER — METHYLPREDNISOLONE 4 MG/1
TABLET ORAL
Qty: 1 PACKAGE | Refills: 0 | OUTPATIENT
Start: 2019-04-10 | End: 2020-11-23

## 2019-04-10 RX ORDER — AZITHROMYCIN 250 MG/1
TABLET, FILM COATED ORAL
Qty: 6 TABLET | Refills: 0 | Status: SHIPPED | OUTPATIENT
Start: 2019-04-10 | End: 2019-04-14

## 2019-04-10 RX ORDER — TRIAMCINOLONE ACETONIDE 40 MG/ML
40 INJECTION, SUSPENSION INTRA-ARTICULAR; INTRAMUSCULAR ONCE
Status: COMPLETED | OUTPATIENT
Start: 2019-04-10 | End: 2019-04-10

## 2019-04-10 RX ADMIN — TRIAMCINOLONE ACETONIDE 40 MG: 400 INJECTION, SUSPENSION INTRA-ARTICULAR; INTRAMUSCULAR at 11:04

## 2019-04-10 NOTE — PROGRESS NOTES
Subjective:       Patient ID: Rivka Moreau is a 29 y.o. female.    Chief Complaint: Sore Throat; Fever; and Otalgia    HPI:28 yo WF in for cold -- sick x 2 days-- + subjective fever-chills, + runny nose-more congested, + sore th, + cough, + phlegm -- green and clear- + asthma + wheezing especially at night --had an inhaler but out No P.TB, No smoking        Patient just back from [facility] went to hair coloring convention  ROS:  Skin: no psoriasis, eczema, skin cancer  HEENT: + headache frontal , no  ocular pain, blurred vision, diplopia, epistaxis,+ hoarseness+ change in voice,no  thyroid trouble  Lung: No pneumonia,  Tb, +wheezing, SOB, + asthma, no smoking  Heart: No chest pain, ankle edema, palpitations, MI, nathan murmur, hypertension, hyperlipidemia-- hx hyopotension--doing OK   Abdomen: No nausea, vomiting, diarrhea, constipation, ulcers, hepatitis, gallbladder disease, melena, hematochezia, hematemesis  : no UTI, renal disease, stones   Gyn LMP now  PAP 2 mo ago   MS: no fractures, O/A, lupus, rheumatoid, gout  Neuro: No dizziness, LOC, seizures   No diabetes, no anemia, + anxiety, no depression   Engaged, 2 children, 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     Objective:   Physical Exam:  General: Well nourished, well developed, no acute distress + overweight   Skin: No lesions  HEENT: Eyes PERRLA, EOM intact, nose cl D/C , throat  +1/4 erythematous ears TM cl   NECK: Supple, no bruits, No JVD, no nodes  Lungs: Clear, no rales, rhonchi, wheezing +coarse cough  Heart: Regular rate and rhythm, no murmurs, gallops, or rubs  Abdomen: flat, bowel sounds positive, no tenderness, or organomegaly  MS: Range of motion and muscle strength intact --tenderness left scapular area especially the in for scapular portion around T8 pain with anterior posterior flexion shoulders pain with raising arms overhead pain in the left upper trapezius lateral flexion neck to the right sore with raising arms  overhead  Neuro: Alert, CN intact, oriented X 3  Extremities: No cyanosis, clubbing, or edema         Assessment:       1. Sinusitis, unspecified chronicity, unspecified location    2. Bronchitis    3. Bronchospasm    4. Asthma, unspecified asthma severity, unspecified whether complicated, unspecified whether persistent    5. Incomplete right bundle branch block    6. Hypotension, unspecified hypotension type    7. Class 1 obesity with body mass index (BMI) of 33.0 to 33.9 in adult, unspecified obesity type, unspecified whether serious comorbidity present        Plan:       Sinusitis, unspecified chronicity, unspecified location  -     POCT INFLUENZA A/B    Bronchitis  -     POCT INFLUENZA A/B    Bronchospasm  -     POCT INFLUENZA A/B    Asthma, unspecified asthma severity, unspecified whether complicated, unspecified whether persistent  -     CBC auto differential; Future; Expected date: 04/10/2019  -     Comprehensive metabolic panel; Future; Expected date: 04/10/2019  -     Fecal Immunochemical Test (iFOBT); Future; Expected date: 04/10/2019  -     Lipid panel; Future; Expected date: 04/10/2019  -     X-Ray Chest PA And Lateral; Future; Expected date: 04/10/2019  -     POCT urine dipstick without microscope  -     T4, free; Future; Expected date: 04/10/2019  -     TSH; Future; Expected date: 04/10/2019    Incomplete right bundle branch block    Hypotension, unspecified hypotension type  -     CBC auto differential; Future; Expected date: 04/10/2019  -     Comprehensive metabolic panel; Future; Expected date: 04/10/2019  -     Fecal Immunochemical Test (iFOBT); Future; Expected date: 04/10/2019  -     Lipid panel; Future; Expected date: 04/10/2019  -     X-Ray Chest PA And Lateral; Future; Expected date: 04/10/2019  -     POCT urine dipstick without microscope  -     T4, free; Future; Expected date: 04/10/2019  -     TSH; Future; Expected date: 04/10/2019    Class 1 obesity with body mass index (BMI) of 33.0 to  33.9 in adult, unspecified obesity type, unspecified whether serious comorbidity present    Other orders  -     triamcinolone acetonide injection 40 mg  -     azithromycin (Z-CARLY) 250 MG tablet; 2 tabs by mouth day 1, then 1 tab by mouth daily x 4 days  Dispense: 6 tablet; Refill: 0  -     promethazine-codeine 6.25-10 mg/5 ml (PHENERGAN WITH CODEINE) 6.25-10 mg/5 mL syrup; Take 5 mLs by mouth every 6 (six) hours as needed for Cough.  Dispense: 180 mL; Refill: 0  -     methylPREDNISolone (MEDROL DOSEPACK) 4 mg tablet; use as directed  Dispense: 1 Package; Refill: 0  -     albuterol (PROVENTIL/VENTOLIN HFA) 90 mcg/actuation inhaler; Inhale 2 puffs into the lungs every 4 (four) hours as needed for Wheezing or Shortness of Breath. Rescue  Dispense: 1 Inhaler; Refill: 5       Cold Kenalog zith/Medrol/Phenergan with cod  Obesity--exercise/decrease weight  Routine lab CBCs CMP lipids T4 TSH UA chest x-ray no need for EKG was done recently   Use albuterol inhaler p.r.n.

## 2019-04-10 NOTE — PROGRESS NOTES
Patient ID verified by name and . NKDA. Kenalog 40 mg IM injection given in right ventrogluteal using aseptic technique. Aspirated with no blood noted. Patient tolerated well. Given per physicians order. No adverse reaction noted.

## 2019-04-10 NOTE — TELEPHONE ENCOUNTER
----- Message from Emily Corrigan sent at 4/10/2019  9:18 AM CDT -----  Contact: Mother  Type:  Same Day Appointment Request    Caller is requesting a same day appointment.  Caller declined first available appointment listed below.      Name of Caller:  Kim mother  When is the first available appointment?  04/25/2019  Symptoms:  Fever, chills, sore throat  Best Call Back Number:  [phone]  Additional Information:   Please call her mother. Thanks.

## 2019-04-10 NOTE — TELEPHONE ENCOUNTER
----- Message from Yoko Nino sent at 4/10/2019  1:10 PM CDT -----  Contact: Jaime almonte/ [facility]  Type:  Pharmacy Calling to Clarify an RX    Name of Caller:  Jaime  Pharmacy Name:  [facility]   Prescription Name:  albuterol (PROVENTIL/VENTOLIN HFA) 90 mcg/actuation inhaler  What do they need to clarify?:  See below  Best Call Back Number:  [phone]  Additional Information:  albuterol (PROVENTIL/VENTOLIN HFA) 90 mcg/actuation inhaler not preferred--can we send in ProAir instead? Please advise--thank you

## 2019-04-10 NOTE — TELEPHONE ENCOUNTER
Called spoke with Jaime from [facility] advised its okay to change ventolin inhaler to ProAir. States her understanding

## 2020-02-18 ENCOUNTER — CLINICAL SUPPORT (OUTPATIENT)
Dept: PRIMARY CARE CLINIC | Facility: CLINIC | Age: 30
End: 2020-02-18
Payer: MEDICAID

## 2020-02-18 ENCOUNTER — OFFICE VISIT (OUTPATIENT)
Dept: PRIMARY CARE CLINIC | Facility: CLINIC | Age: 30
End: 2020-02-18
Payer: MEDICAID

## 2020-02-18 VITALS
OXYGEN SATURATION: 100 % | WEIGHT: 240.06 LBS | TEMPERATURE: 99 F | HEART RATE: 60 BPM | RESPIRATION RATE: 18 BRPM | HEIGHT: 68 IN | BODY MASS INDEX: 36.38 KG/M2 | SYSTOLIC BLOOD PRESSURE: 118 MMHG | DIASTOLIC BLOOD PRESSURE: 66 MMHG

## 2020-02-18 DIAGNOSIS — I45.10 INCOMPLETE RIGHT BUNDLE BRANCH BLOCK: ICD-10-CM

## 2020-02-18 DIAGNOSIS — J45.909 ASTHMA, UNSPECIFIED ASTHMA SEVERITY, UNSPECIFIED WHETHER COMPLICATED, UNSPECIFIED WHETHER PERSISTENT: ICD-10-CM

## 2020-02-18 DIAGNOSIS — M47.816 OSTEOARTHRITIS OF LUMBAR SPINE, UNSPECIFIED SPINAL OSTEOARTHRITIS COMPLICATION STATUS: ICD-10-CM

## 2020-02-18 DIAGNOSIS — J98.01 BRONCHOSPASM: ICD-10-CM

## 2020-02-18 DIAGNOSIS — J45.909 ASTHMA, UNSPECIFIED ASTHMA SEVERITY, UNSPECIFIED WHETHER COMPLICATED, UNSPECIFIED WHETHER PERSISTENT: Primary | ICD-10-CM

## 2020-02-18 DIAGNOSIS — E66.9 OBESITY, UNSPECIFIED CLASSIFICATION, UNSPECIFIED OBESITY TYPE, UNSPECIFIED WHETHER SERIOUS COMORBIDITY PRESENT: ICD-10-CM

## 2020-02-18 PROBLEM — E66.811 CLASS 1 OBESITY WITH BODY MASS INDEX (BMI) OF 33.0 TO 33.9 IN ADULT: Status: RESOLVED | Noted: 2019-04-10 | Resolved: 2020-02-18

## 2020-02-18 LAB
ALBUMIN SERPL BCP-MCNC: 3.7 G/DL (ref 3.5–5.2)
ALP SERPL-CCNC: 68 U/L (ref 38–126)
ALT SERPL W/O P-5'-P-CCNC: 12 U/L (ref 14–54)
ANION GAP SERPL CALC-SCNC: 7 MMOL/L (ref 8–16)
AST SERPL-CCNC: 15 U/L (ref 15–41)
BASOPHILS # BLD AUTO: 0 K/UL (ref 0–0.2)
BASOPHILS NFR BLD: 0.4 % (ref 0–1.9)
BILIRUB SERPL-MCNC: 0.6 MG/DL (ref 0.3–1.2)
BILIRUB SERPL-MCNC: ABNORMAL MG/DL
BLOOD URINE, POC: ABNORMAL
BUN SERPL-MCNC: 10 MG/DL (ref 6–20)
CALCIUM SERPL-MCNC: 8.7 MG/DL (ref 8.6–10)
CHLORIDE SERPL-SCNC: 107 MMOL/L (ref 101–111)
CHOLEST SERPL-MCNC: 125 MG/DL (ref 80–200)
CHOLEST/HDLC SERPL: 2.3 {RATIO} (ref 2–5)
CO2 SERPL-SCNC: 25 MMOL/L (ref 23–29)
COLOR, POC UA: YELLOW
CREAT SERPL-MCNC: 0.7 MG/DL (ref 0.5–1.4)
DIFFERENTIAL METHOD: ABNORMAL
EOSINOPHIL # BLD AUTO: 0.3 K/UL (ref 0–0.5)
EOSINOPHIL NFR BLD: 3.6 % (ref 0–8)
ERYTHROCYTE [DISTWIDTH] IN BLOOD BY AUTOMATED COUNT: 13.7 % (ref 11.5–14.5)
EST. GFR  (AFRICAN AMERICAN): >60 ML/MIN/1.73 M^2
EST. GFR  (NON AFRICAN AMERICAN): >60 ML/MIN/1.73 M^2
GLUCOSE SERPL-MCNC: 93 MG/DL (ref 74–118)
GLUCOSE UR QL STRIP: NORMAL
HCT VFR BLD AUTO: 38.2 % (ref 37–48.5)
HDLC SERPL-MCNC: 54 MG/DL (ref 40–75)
HDLC SERPL: 43.2 % (ref 20–50)
HGB BLD-MCNC: 12.9 G/DL (ref 12–16)
KETONES UR QL STRIP: ABNORMAL
LDLC SERPL CALC-MCNC: 60 MG/DL
LEUKOCYTE ESTERASE URINE, POC: ABNORMAL
LYMPHOCYTES # BLD AUTO: 1.8 K/UL (ref 1–4.8)
LYMPHOCYTES NFR BLD: 25 % (ref 18–48)
MCH RBC QN AUTO: 29.5 PG (ref 27–31)
MCHC RBC AUTO-ENTMCNC: 33.7 G/DL (ref 32–36)
MCV RBC AUTO: 88 FL (ref 82–98)
MONOCYTES # BLD AUTO: 0.4 K/UL (ref 0.3–1)
MONOCYTES NFR BLD: 5.8 % (ref 4–15)
NEUTROPHILS # BLD AUTO: 4.7 K/UL (ref 1.8–7.7)
NEUTROPHILS NFR BLD: 65.2 % (ref 38–73)
NITRITE, POC UA: ABNORMAL
NONHDLC SERPL-MCNC: 71 MG/DL
PH, POC UA: 6
PLATELET # BLD AUTO: 246 K/UL (ref 150–350)
PMV BLD AUTO: 8.3 FL (ref 9.2–12.9)
POTASSIUM SERPL-SCNC: 3.9 MMOL/L (ref 3.5–5.1)
PROT SERPL-MCNC: 7.2 G/DL (ref 6–8.4)
PROTEIN, POC: ABNORMAL
RBC # BLD AUTO: 4.36 M/UL (ref 4–5.4)
SODIUM SERPL-SCNC: 139 MMOL/L (ref 136–145)
SPECIFIC GRAVITY, POC UA: 1.01
T4 FREE SERPL-MCNC: 0.8 NG/DL (ref 0.61–1.12)
TRIGL SERPL-MCNC: 55 MG/DL (ref 30–150)
TSH SERPL DL<=0.005 MIU/L-ACNC: 1.32 UIU/ML (ref 0.45–5.33)
UROBILINOGEN, POC UA: NORMAL
WBC # BLD AUTO: 7.2 K/UL (ref 3.9–12.7)

## 2020-02-18 PROCEDURE — 99395 PREV VISIT EST AGE 18-39: CPT | Mod: S$PBB,,, | Performed by: FAMILY MEDICINE

## 2020-02-18 PROCEDURE — 36415 COLL VENOUS BLD VENIPUNCTURE: CPT | Mod: PBBFAC,PN

## 2020-02-18 PROCEDURE — 84443 ASSAY THYROID STIM HORMONE: CPT

## 2020-02-18 PROCEDURE — 99395 PR PREVENTIVE VISIT,EST,18-39: ICD-10-PCS | Mod: S$PBB,,, | Performed by: FAMILY MEDICINE

## 2020-02-18 PROCEDURE — 84439 ASSAY OF FREE THYROXINE: CPT

## 2020-02-18 PROCEDURE — 99999 PR PBB SHADOW E&M-EST. PATIENT-LVL III: CPT | Mod: PBBFAC,,, | Performed by: FAMILY MEDICINE

## 2020-02-18 PROCEDURE — 80061 LIPID PANEL: CPT

## 2020-02-18 PROCEDURE — 80053 COMPREHEN METABOLIC PANEL: CPT

## 2020-02-18 PROCEDURE — 81002 URINALYSIS NONAUTO W/O SCOPE: CPT | Mod: PBBFAC,PN | Performed by: FAMILY MEDICINE

## 2020-02-18 PROCEDURE — 99213 OFFICE O/P EST LOW 20 MIN: CPT | Mod: PBBFAC,PN | Performed by: FAMILY MEDICINE

## 2020-02-18 PROCEDURE — 99999 PR PBB SHADOW E&M-EST. PATIENT-LVL III: ICD-10-PCS | Mod: PBBFAC,,, | Performed by: FAMILY MEDICINE

## 2020-02-18 PROCEDURE — 85025 COMPLETE CBC W/AUTO DIFF WBC: CPT

## 2020-02-18 RX ORDER — ALBUTEROL SULFATE 90 UG/1
2 AEROSOL, METERED RESPIRATORY (INHALATION) EVERY 4 HOURS PRN
Qty: 18 G | Refills: 5 | Status: SHIPPED | OUTPATIENT
Start: 2020-02-18 | End: 2021-03-01 | Stop reason: SDUPTHER

## 2020-02-18 RX ORDER — ALBUTEROL SULFATE 90 UG/1
2 AEROSOL, METERED RESPIRATORY (INHALATION) EVERY 6 HOURS PRN
Qty: 18 G | Refills: 0 | Status: CANCELLED | OUTPATIENT
Start: 2020-02-18 | End: 2021-02-17

## 2020-02-18 NOTE — PROGRESS NOTES
Subjective:       Patient ID: Rivka Moreau is a 30 y.o. female.    Chief Complaint: Annual Exam    HPI:28 yo WF in for cold -- up patient in for annual checkup--needs refills of albuterol.  Eating well, +BM, ambulating well--upset gained about 15 lbs.   ROS:  Skin: no psoriasis, eczema, skin cancer  HEENT:  No headache , no  ocular pain, blurred vision, diplopia, epistaxis, hoarseness change in voice,no  thyroid trouble  Lung: No pneumonia,  Tb, +wheezing, SOB, + asthma, no smoking  Heart: No chest pain, ankle edema, palpitations, MI, nathan murmur, hypertension, hyperlipidemia  Abdomen: No nausea, vomiting, diarrhea, constipation, ulcers, hepatitis, gallbladder disease, melena, hematochezia, hematemesis  : no UTI, renal disease, stones   Gyn LMP 01/26/2020, PAP during the holidays   MS: no fractures, O/A, lupus, rheumatoid, gout  Neuro: No dizziness, LOC, seizures   No diabetes, no anemia, + anxiety, no depression   Engaged, 2 children, 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     Objective:   Physical Exam:  General: Well nourished, well developed, no acute distress + overweight   Skin: No lesions  HEENT: Eyes PERRLA, EOM intact, nose clear , throat  Non erythematous ears TM cl   NECK: Supple, no bruits, No JVD, no nodes  Lungs: Clear, no rales, rhonchi, wheezing   Heart: Regular rate and rhythm, no murmurs, gallops, or rubs  Abdomen: flat, bowel sounds positive, no tenderness, or organomegaly  MS:  Range of motion muscle strength intact reflexes 2/4   Neuro: Alert, CN intact, oriented X 3 Romberg negative heel-toe intact  Extremities: No cyanosis, clubbing, or edema         Assessment:       1. Asthma, unspecified asthma severity, unspecified whether complicated, unspecified whether persistent    2. Bronchospasm    3. Incomplete right bundle branch block    4. Osteoarthritis of lumbar spine, unspecified spinal osteoarthritis complication status    5. Obesity, unspecified classification, unspecified  obesity type, unspecified whether serious comorbidity present        Plan:       Asthma, unspecified asthma severity, unspecified whether complicated, unspecified whether persistent    Bronchospasm    Incomplete right bundle branch block    Osteoarthritis of lumbar spine, unspecified spinal osteoarthritis complication status    Obesity, unspecified classification, unspecified obesity type, unspecified whether serious comorbidity present       Obesity--exercise/decrease weight--could Ej Alcaraz--ideal protein--could do weight watchers  Routine lab CBCs CMP lipids T4 TSH UA chest x-ray  EKG   Health maintenance stool guaiac HIV tetanus Pap smear flu   Use albuterol inhaler p.r.n.

## 2020-02-21 ENCOUNTER — TELEPHONE (OUTPATIENT)
Dept: PRIMARY CARE CLINIC | Facility: CLINIC | Age: 30
End: 2020-02-21

## 2020-02-21 NOTE — TELEPHONE ENCOUNTER
----- Message from Mikaela Evangelista sent at 2/21/2020 10:09 AM CST -----  Contact: Pt [phone]  Patient is returning a phone call.  Who left a message for the patient: Margarita  Does patient know what this is regarding:  Yes. Lab results  Comments:

## 2020-02-21 NOTE — TELEPHONE ENCOUNTER
----- Message from Silvina Duncan sent at 2/21/2020 10:23 AM CST -----  Contact: [phone]  Type: Returning a call    Who left a message?  Randy    When did the practice call?  today    Comments:  Regarding lab results.  Please advise, thank you.

## 2020-07-26 ENCOUNTER — PATIENT OUTREACH (OUTPATIENT)
Dept: ADMINISTRATIVE | Facility: HOSPITAL | Age: 30
End: 2020-07-26

## 2020-10-07 ENCOUNTER — TELEPHONE (OUTPATIENT)
Dept: PRIMARY CARE CLINIC | Facility: CLINIC | Age: 30
End: 2020-10-07

## 2020-10-07 DIAGNOSIS — I95.9 HYPOTENSION, UNSPECIFIED HYPOTENSION TYPE: ICD-10-CM

## 2020-10-07 DIAGNOSIS — E78.5 HYPERLIPIDEMIA, UNSPECIFIED HYPERLIPIDEMIA TYPE: ICD-10-CM

## 2020-10-07 DIAGNOSIS — E66.9 OBESITY, UNSPECIFIED CLASSIFICATION, UNSPECIFIED OBESITY TYPE, UNSPECIFIED WHETHER SERIOUS COMORBIDITY PRESENT: Primary | ICD-10-CM

## 2020-10-07 NOTE — TELEPHONE ENCOUNTER
----- Message from Mikaela Evangelista sent at 10/7/2020  2:20 PM CDT -----  Regarding: Pt [phone]  Type: Orders Request    What orders/ testing are being requested? Over due 6 month    Is there a future appointment scheduled for the patient with PCP? No       Comments: She requested these labs because, she has been feeling tired    CBC auto differential [[id number]]  Comprehensive metabolic panel [LAB17]  Lipid panel [LAB18]  T4, free [KYE349]  TSH [QDB627]

## 2020-10-13 ENCOUNTER — TELEPHONE (OUTPATIENT)
Dept: PRIMARY CARE CLINIC | Facility: CLINIC | Age: 30
End: 2020-10-13

## 2020-11-22 ENCOUNTER — HOSPITAL ENCOUNTER (EMERGENCY)
Facility: HOSPITAL | Age: 30
Discharge: HOME | End: 2020-11-23
Attending: EMERGENCY MEDICINE
Payer: MEDICAID

## 2020-11-22 DIAGNOSIS — R20.0 NUMBNESS: Primary | ICD-10-CM

## 2020-11-22 DIAGNOSIS — G51.4 FACIAL TWITCHING: ICD-10-CM

## 2020-11-22 LAB
B-HCG UR QL: NEGATIVE
BASOPHILS # BLD AUTO: 0.04 K/UL (ref 0–0.2)
BASOPHILS NFR BLD: 0.4 % (ref 0–1.9)
BILIRUB UR QL STRIP: NEGATIVE
CLARITY UR REFRACT.AUTO: CLEAR
COLOR UR AUTO: COLORLESS
CTP QC/QA: YES
DIFFERENTIAL METHOD: ABNORMAL
EOSINOPHIL # BLD AUTO: 0.3 K/UL (ref 0–0.5)
EOSINOPHIL NFR BLD: 2.9 % (ref 0–8)
ERYTHROCYTE [DISTWIDTH] IN BLOOD BY AUTOMATED COUNT: 13.2 % (ref 11.5–14.5)
GLUCOSE UR QL STRIP: NEGATIVE
HCT VFR BLD AUTO: 39.9 % (ref 37–48.5)
HGB BLD-MCNC: 12.9 G/DL (ref 12–16)
HGB UR QL STRIP: NEGATIVE
IMM GRANULOCYTES # BLD AUTO: 0.05 K/UL (ref 0–0.04)
IMM GRANULOCYTES NFR BLD AUTO: 0.5 % (ref 0–0.5)
KETONES UR QL STRIP: NEGATIVE
LEUKOCYTE ESTERASE UR QL STRIP: NEGATIVE
LYMPHOCYTES # BLD AUTO: 2.6 K/UL (ref 1–4.8)
LYMPHOCYTES NFR BLD: 24.9 % (ref 18–48)
MCH RBC QN AUTO: 29 PG (ref 27–31)
MCHC RBC AUTO-ENTMCNC: 32.3 G/DL (ref 32–36)
MCV RBC AUTO: 90 FL (ref 82–98)
MONOCYTES # BLD AUTO: 0.7 K/UL (ref 0.3–1)
MONOCYTES NFR BLD: 6.4 % (ref 4–15)
NEUTROPHILS # BLD AUTO: 6.7 K/UL (ref 1.8–7.7)
NEUTROPHILS NFR BLD: 64.9 % (ref 38–73)
NITRITE UR QL STRIP: NEGATIVE
NRBC BLD-RTO: 0 /100 WBC
PH UR STRIP: 7 [PH] (ref 5–8)
PLATELET # BLD AUTO: 254 K/UL (ref 150–350)
PMV BLD AUTO: 9.9 FL (ref 9.2–12.9)
POCT GLUCOSE: 96 MG/DL (ref 70–110)
PROT UR QL STRIP: NEGATIVE
RBC # BLD AUTO: 4.45 M/UL (ref 4–5.4)
SP GR UR STRIP: 1 (ref 1–1.03)
TSH SERPL DL<=0.005 MIU/L-ACNC: 0.88 UIU/ML (ref 0.4–4)
URN SPEC COLLECT METH UR: ABNORMAL
VIT B12 SERPL-MCNC: 314 PG/ML (ref 210–950)
WBC # BLD AUTO: 10.36 K/UL (ref 3.9–12.7)

## 2020-11-22 PROCEDURE — 84443 ASSAY THYROID STIM HORMONE: CPT

## 2020-11-22 PROCEDURE — 99284 EMERGENCY DEPT VISIT MOD MDM: CPT | Mod: ,,, | Performed by: EMERGENCY MEDICINE

## 2020-11-22 PROCEDURE — 80053 COMPREHEN METABOLIC PANEL: CPT

## 2020-11-22 PROCEDURE — 82607 VITAMIN B-12: CPT

## 2020-11-22 PROCEDURE — 85025 COMPLETE CBC W/AUTO DIFF WBC: CPT

## 2020-11-22 PROCEDURE — 82962 GLUCOSE BLOOD TEST: CPT

## 2020-11-22 PROCEDURE — 99284 PR EMERGENCY DEPT VISIT,LEVEL IV: ICD-10-PCS | Mod: ,,, | Performed by: EMERGENCY MEDICINE

## 2020-11-22 PROCEDURE — 99285 EMERGENCY DEPT VISIT HI MDM: CPT | Mod: 25

## 2020-11-22 PROCEDURE — 83735 ASSAY OF MAGNESIUM: CPT

## 2020-11-22 PROCEDURE — 81025 URINE PREGNANCY TEST: CPT | Performed by: EMERGENCY MEDICINE

## 2020-11-22 PROCEDURE — 81003 URINALYSIS AUTO W/O SCOPE: CPT

## 2020-11-23 ENCOUNTER — TELEPHONE (OUTPATIENT)
Dept: NEUROLOGY | Facility: CLINIC | Age: 30
End: 2020-11-23

## 2020-11-23 VITALS
BODY MASS INDEX: 45.75 KG/M2 | TEMPERATURE: 99 F | WEIGHT: 233 LBS | HEART RATE: 70 BPM | RESPIRATION RATE: 18 BRPM | SYSTOLIC BLOOD PRESSURE: 120 MMHG | OXYGEN SATURATION: 100 % | DIASTOLIC BLOOD PRESSURE: 68 MMHG | HEIGHT: 60 IN

## 2020-11-23 LAB
ALBUMIN SERPL BCP-MCNC: 3.7 G/DL (ref 3.5–5.2)
ALP SERPL-CCNC: 88 U/L (ref 55–135)
ALT SERPL W/O P-5'-P-CCNC: 11 U/L (ref 10–44)
ANION GAP SERPL CALC-SCNC: 8 MMOL/L (ref 8–16)
AST SERPL-CCNC: 14 U/L (ref 10–40)
BILIRUB SERPL-MCNC: 0.3 MG/DL (ref 0.1–1)
BUN SERPL-MCNC: 11 MG/DL (ref 6–20)
CALCIUM SERPL-MCNC: 8.8 MG/DL (ref 8.7–10.5)
CHLORIDE SERPL-SCNC: 106 MMOL/L (ref 95–110)
CO2 SERPL-SCNC: 25 MMOL/L (ref 23–29)
CREAT SERPL-MCNC: 0.8 MG/DL (ref 0.5–1.4)
EST. GFR  (AFRICAN AMERICAN): >60 ML/MIN/1.73 M^2
EST. GFR  (NON AFRICAN AMERICAN): >60 ML/MIN/1.73 M^2
GLUCOSE SERPL-MCNC: 89 MG/DL (ref 70–110)
MAGNESIUM SERPL-MCNC: 1.8 MG/DL (ref 1.6–2.6)
POTASSIUM SERPL-SCNC: 3.6 MMOL/L (ref 3.5–5.1)
PROT SERPL-MCNC: 7 G/DL (ref 6–8.4)
SODIUM SERPL-SCNC: 139 MMOL/L (ref 136–145)

## 2020-11-23 PROCEDURE — A9585 GADOBUTROL INJECTION: HCPCS | Performed by: EMERGENCY MEDICINE

## 2020-11-23 PROCEDURE — 25500020 PHARM REV CODE 255: Performed by: EMERGENCY MEDICINE

## 2020-11-23 RX ORDER — GADOBUTROL 604.72 MG/ML
10 INJECTION INTRAVENOUS
Status: COMPLETED | OUTPATIENT
Start: 2020-11-23 | End: 2020-11-23

## 2020-11-23 RX ADMIN — GADOBUTROL 10 ML: 604.72 INJECTION INTRAVENOUS at 01:11

## 2020-11-23 NOTE — DISCHARGE INSTRUCTIONS
Your MRI is not totally normal, as noted below.  You need to follow-up with a neurologist for further evaluation  .  MRI Brain Demyelinating W W/O Contrast   Final Result      Three small foci of T2/FLAIR increased signal intensity are noted in the supratentorial white matter as above.  None of these demonstrate diffusion restriction or abnormal enhancement.  Finding is nonspecific but small plaques related to demyelinating disease are not excluded.  No pericallosal white matter abnormality seen.  No infratentorial lesions identified.  Recommend neurology consultation and correlation with CSF analysis, as clinically indicated, regarding clinical suspicion for multiple sclerosis.         Electronically signed by: Catalino De La Torre MD   Date:    11/23/2020   Time:    01:42          Treatments you had today:   Medications   gadobutroL (GADAVIST) injection 10 mL (10 mLs Intravenous Given 11/23/20 0111)       Follow-Up Plan:  - Follow-up with primary care doctor within 3 - 5 days  - Additional testing and/or evaluation as directed by your primary doctor  - A referral has been placed for Neurology.  If they do not call you, please call them to set up an appoint    Return to the Emergency Department for symptoms including but not limited to: worsening symptoms, shortness of breath or chest pain, vomiting with inability to hold down fluids, fevers greater than 100.4°F, passing out/fainting/unconsciousness, or other concerning symptoms.

## 2020-11-23 NOTE — PROVIDER PROGRESS NOTES - EMERGENCY DEPT.
"Sign-out note.  This is a 30-year-old female with no chronic medical issues presenting to ER with intermittent numbness of the extremity, intermittent twitching, and ataxia.  She is neuro intact here without ataxia.  Hemodynamically stable.    Labs unremarkable.  At time of sign-out, MRI is pending for final disposition.    MRI Brain Demyelinating W W/O Contrast   Final Result      Three small foci of T2/FLAIR increased signal intensity are noted in the supratentorial white matter as above.  None of these demonstrate diffusion restriction or abnormal enhancement.  Finding is nonspecific but small plaques related to demyelinating disease are not excluded.  No pericallosal white matter abnormality seen.  No infratentorial lesions identified.  Recommend neurology consultation and correlation with CSF analysis, as clinically indicated, regarding clinical suspicion for multiple sclerosis.         Electronically signed by: Catalino De La Torre MD   Date:    11/23/2020   Time:    01:42        MRI with above read, possible foci of increased signal intensity that could be consistent with demyelinating disease.  Patient is neuro intact at this time.  She states that she has had eye twitching and "lip twitching" - plan urgent referral to outpatient Neurology for further evaluation.    Discharged home in stable condition.  Return precautions discussed at bedside  "

## 2020-11-23 NOTE — ED PROVIDER NOTES
"Encounter Date: 11/22/2020    SCRIBE #1 NOTE: I, Kalen Gastelum, am scribing for, and in the presence of,  Kellee Corona MD. I have scribed the following portions of the note - Other sections scribed: HPI, ROS, PE.       History     Chief Complaint   Patient presents with    Dizziness     pt reports dizziness and generalized facial twitching x 2 weeks intermittently that has gotten worse today. denies HA, chest pain, or unilateral weakness. speech is clear in triage.     Time patient was seen by the provider: 9:31 PM    The patient is a 30 y.o. female with past medical history of shingles rash who presents to the ED with a complaint of dizziness and generalized facial twitching for the past two weeks. The patient reports that for that past couple months she has been experiencing facial twitching in different segments of her face. However within the past few weeks the facial twitching has been localized to her lips and right side of her face. The patient reports that today it has worsened and she felt unbalanced for the past few hours. She has felt off balance before.  She reports that her "brain feels fuzzy" and that her vision is on "auto focus". The patient reports that she experiences numbness when she is standing for too long and tingling to her hands and feet bilaterally. Denies any rashes to her face. No history of MS or pregnancy. Patient endorses light headiness, tinnitus, dizziness, facial twitching, numbness, visual disturbance, and tingling. Denies diarrhea, fever, chills, vomiting, nausea, dysuria, shortness of breath, fatigue, abdominal pain, and chest pain.    The history is provided by the patient and medical records.     Review of patient's allergies indicates:  No Known Allergies  Past Medical History:   Diagnosis Date    Shingles rash      Past Surgical History:   Procedure Laterality Date    EYE SURGERY      OOPHORECTOMY Left      History reviewed. No pertinent family " history.  Social History     Tobacco Use    Smoking status: Never Smoker    Smokeless tobacco: Never Used   Substance Use Topics    Alcohol use: No    Drug use: No     Review of Systems   Constitutional: Negative for fever.   HENT: Positive for tinnitus. Negative for sore throat.         Positive for Facial twitching   Eyes: Positive for visual disturbance.   Respiratory: Negative for shortness of breath.    Cardiovascular: Negative for chest pain.   Gastrointestinal: Negative for diarrhea, nausea and vomiting.   Endocrine: Negative for polyuria.   Genitourinary: Negative for dysuria.   Musculoskeletal: Negative for back pain.   Skin: Negative for rash.   Allergic/Immunologic: Negative for immunocompromised state.   Neurological: Positive for dizziness, light-headedness and numbness. Negative for weakness.   Hematological: Does not bruise/bleed easily.       Physical Exam     Initial Vitals [11/22/20 2105]   BP Pulse Resp Temp SpO2   135/86 67 16 98.6 °F (37 °C) 99 %      MAP       --         Physical Exam    Nursing note and vitals reviewed.  Constitutional: She appears well-developed and well-nourished. She is not diaphoretic. No distress.   HENT:   Head: Normocephalic and atraumatic.   Right Ear: External ear normal.   Left Ear: External ear normal.   Nose: Nose normal.   Eyes: Conjunctivae and EOM are normal. Pupils are equal, round, and reactive to light.   Neck: Normal range of motion. Neck supple.   Cardiovascular: Normal rate and regular rhythm.   Pulmonary/Chest: No respiratory distress.   Abdominal: Soft. There is no abdominal tenderness.   Musculoskeletal: Normal range of motion. No tenderness or edema.   Neurological: She is alert and oriented to person, place, and time. She has normal strength. No cranial nerve deficit or sensory deficit. GCS score is 15. GCS eye subscore is 4. GCS verbal subscore is 5. GCS motor subscore is 6.   Normal finger to nose bilaterally, no nystagmus     Skin: Skin is warm  and dry.   Psychiatric: She has a normal mood and affect. Her behavior is normal. Judgment and thought content normal.         ED Course   Procedures  Labs Reviewed   CBC W/ AUTO DIFFERENTIAL - Abnormal; Notable for the following components:       Result Value    Immature Grans (Abs) 0.05 (*)     All other components within normal limits   URINALYSIS, REFLEX TO URINE CULTURE - Abnormal; Notable for the following components:    Color, UA Colorless (*)     All other components within normal limits    Narrative:     Specimen Source->Urine   TSH   VITAMIN B12   COMPREHENSIVE METABOLIC PANEL   MAGNESIUM   POCT URINE PREGNANCY   POCT GLUCOSE          Imaging Results          MRI Brain Demyelinating W W/O Contrast (Final result)  Result time 11/23/20 01:42:35    Final result by Catalino De La Torre MD (11/23/20 01:42:35)                 Impression:      Three small foci of T2/FLAIR increased signal intensity are noted in the supratentorial white matter as above.  None of these demonstrate diffusion restriction or abnormal enhancement.  Finding is nonspecific but small plaques related to demyelinating disease are not excluded.  No pericallosal white matter abnormality seen.  No infratentorial lesions identified.  Recommend neurology consultation and correlation with CSF analysis, as clinically indicated, regarding clinical suspicion for multiple sclerosis.      Electronically signed by: Catalino De La Torre MD  Date:    11/23/2020  Time:    01:42             Narrative:    EXAMINATION:  MRI BRAIN DEMYELINATING W/ WO CONTRAST    CLINICAL HISTORY:  Multiple sclerosis, baseline;    TECHNIQUE:  Multiplanar multisequence MR imaging of the brain was performed before and after the administration of 10 mL Gadavist  intravenous contrast.    COMPARISON:  None.    FINDINGS:  Intracranial compartment:    Three small foci of T2/FLAIR increased signal intensity are noted in the white matter in the right occipital lobe (series 6, image 47) left  parietal lobe (series 6, image 95) and right inferior frontal lobe (series 6, image 134).  No mass lesion, acute hemorrhage, edema or acute infarct.  No abnormal enhancement.    Ventricles and sulci are normal in size for age without evidence of hydrocephalus.    No extra-axial blood or fluid collections.    Normal vascular flow voids are preserved.    Skull/extracranial contents (limited evaluation): Bone marrow signal intensity is normal.                                 Medical Decision Making:   History:   Old Medical Records: I decided to obtain old medical records.  Initial Assessment:   Patient with multiple episodes of numbness, feeling off balance, and not intermittent facial twitching for the past 2 months    Differential Diagnosis:   MS, electrolyte abnormality, stress, shingles  Clinical Tests:   Lab Tests: Ordered and Reviewed  ED Management:  Care transferred to Dr. Ceja pending MRI to further evaluate for possible MRI  dispo pending results                             Clinical Impression:     ICD-10-CM ICD-9-CM   1. Numbness  R20.0 782.0   2. Facial twitching  G51.4 351.8                          ED Disposition Condition    Discharge Stable        ED Prescriptions     None        Follow-up Information     Follow up With Specialties Details Why Contact Info Additional Information    Delta Reyes MD Family Medicine Schedule an appointment as soon as possible for a visit in 1 week  [address] [address]  [phone]       [facility] - Neurology [facility] Neurology Schedule an appointment as soon as possible for a visit in 1 week  [address] [facility] [address] [phone] Neuro[facility] [place] [facility], 7th Floor Please park in [facility] and take Clinic elevator                                       Kellee Corona MD  11/23/20 [id number]

## 2020-11-23 NOTE — TELEPHONE ENCOUNTER
Received call from pt. Went to ED yesterday. Was told need urgent follow up for MS possibly. Instructed pt will route message to MS team. They will let us know if she needs further testing before being seen by them. Either someone for MS will call or I will call. Pt verbalized understanding.

## 2020-11-23 NOTE — ED TRIAGE NOTES
"Pt has had facial twitching for the past few weeks. Pt sometimes feels weak and "cold tingles".Pt states its as if something wet touches her. Pt came because R side of face started with a constant twitch today. Pt has never had problems like this before and sometimes feels tongue twitching and a constant ringing in the ears. Pt denies nausea/vomiting. When pt drinks alcoholic beverage it burns ears and burning radiates through the jaw. Pt has back and neck pain and thinks she "may have a pinched nerve".   "

## 2020-11-24 ENCOUNTER — TELEPHONE (OUTPATIENT)
Dept: PRIMARY CARE CLINIC | Facility: CLINIC | Age: 30
End: 2020-11-24

## 2020-11-24 NOTE — TELEPHONE ENCOUNTER
----- Message from Radha Hassan sent at 11/24/2020 11:38 AM CST -----  Contact: [phone]  Caller is requesting an earlier appt than we can schedule.  Caller declined first available appointment listed below. Caller will not accept being placed on the wait list and is requesting a message be sent to the provider.  When is the next available appointment:  December 15  Reason for the appointment:  ER follow up  Patient preference of timeframe to be scheduled:  ASAP  Comments:

## 2020-11-30 ENCOUNTER — OFFICE VISIT (OUTPATIENT)
Dept: PRIMARY CARE CLINIC | Facility: CLINIC | Age: 30
End: 2020-11-30
Payer: MEDICAID

## 2020-11-30 VITALS
DIASTOLIC BLOOD PRESSURE: 64 MMHG | OXYGEN SATURATION: 97 % | RESPIRATION RATE: 16 BRPM | HEIGHT: 60 IN | WEIGHT: 240.31 LBS | HEART RATE: 71 BPM | BODY MASS INDEX: 47.18 KG/M2 | SYSTOLIC BLOOD PRESSURE: 108 MMHG

## 2020-11-30 DIAGNOSIS — R51.9 NONINTRACTABLE HEADACHE, UNSPECIFIED CHRONICITY PATTERN, UNSPECIFIED HEADACHE TYPE: ICD-10-CM

## 2020-11-30 DIAGNOSIS — J45.909 ASTHMA, UNSPECIFIED ASTHMA SEVERITY, UNSPECIFIED WHETHER COMPLICATED, UNSPECIFIED WHETHER PERSISTENT: Primary | ICD-10-CM

## 2020-11-30 DIAGNOSIS — R93.89 ABNORMAL MRI: ICD-10-CM

## 2020-11-30 DIAGNOSIS — Z11.59 NEED FOR HEPATITIS C SCREENING TEST: ICD-10-CM

## 2020-11-30 DIAGNOSIS — E66.9 OBESITY, UNSPECIFIED CLASSIFICATION, UNSPECIFIED OBESITY TYPE, UNSPECIFIED WHETHER SERIOUS COMORBIDITY PRESENT: ICD-10-CM

## 2020-11-30 DIAGNOSIS — Z11.4 ENCOUNTER FOR SCREENING FOR HIV: ICD-10-CM

## 2020-11-30 PROCEDURE — 99999 PR PBB SHADOW E&M-EST. PATIENT-LVL III: CPT | Mod: PBBFAC,,, | Performed by: FAMILY MEDICINE

## 2020-11-30 PROCEDURE — 99214 PR OFFICE/OUTPT VISIT, EST, LEVL IV, 30-39 MIN: ICD-10-PCS | Mod: S$PBB,,, | Performed by: FAMILY MEDICINE

## 2020-11-30 PROCEDURE — 99213 OFFICE O/P EST LOW 20 MIN: CPT | Mod: PBBFAC,PN | Performed by: FAMILY MEDICINE

## 2020-11-30 PROCEDURE — 99999 PR PBB SHADOW E&M-EST. PATIENT-LVL III: ICD-10-PCS | Mod: PBBFAC,,, | Performed by: FAMILY MEDICINE

## 2020-11-30 PROCEDURE — 99214 OFFICE O/P EST MOD 30 MIN: CPT | Mod: S$PBB,,, | Performed by: FAMILY MEDICINE

## 2020-12-01 NOTE — TELEPHONE ENCOUNTER
Scheduled patient with KK on 12/8 @ 7:45 am. This is actually a VV day but it was three available slots to get patient in soon.

## 2020-12-07 ENCOUNTER — PATIENT OUTREACH (OUTPATIENT)
Dept: ADMINISTRATIVE | Facility: OTHER | Age: 30
End: 2020-12-07

## 2020-12-08 ENCOUNTER — LAB VISIT (OUTPATIENT)
Dept: LAB | Facility: HOSPITAL | Age: 30
End: 2020-12-08
Payer: MEDICAID

## 2020-12-08 ENCOUNTER — TELEPHONE (OUTPATIENT)
Dept: NEUROLOGY | Facility: CLINIC | Age: 30
End: 2020-12-08

## 2020-12-08 ENCOUNTER — OFFICE VISIT (OUTPATIENT)
Dept: NEUROLOGY | Facility: CLINIC | Age: 30
End: 2020-12-08
Payer: MEDICAID

## 2020-12-08 VITALS
HEIGHT: 60 IN | SYSTOLIC BLOOD PRESSURE: 101 MMHG | DIASTOLIC BLOOD PRESSURE: 70 MMHG | BODY MASS INDEX: 46.93 KG/M2 | HEART RATE: 64 BPM

## 2020-12-08 DIAGNOSIS — R29.2 ABNORMAL REFLEXES OF LOWER EXTREMITY: ICD-10-CM

## 2020-12-08 DIAGNOSIS — Z71.89 COUNSELING REGARDING GOALS OF CARE: ICD-10-CM

## 2020-12-08 DIAGNOSIS — R93.89 ABNORMAL MRI: ICD-10-CM

## 2020-12-08 DIAGNOSIS — R51.9 NONINTRACTABLE HEADACHE, UNSPECIFIED CHRONICITY PATTERN, UNSPECIFIED HEADACHE TYPE: ICD-10-CM

## 2020-12-08 DIAGNOSIS — R93.89 ABNORMAL MRI: Primary | ICD-10-CM

## 2020-12-08 PROCEDURE — 99203 OFFICE O/P NEW LOW 30 MIN: CPT | Mod: S$PBB,,, | Performed by: PHYSICIAN ASSISTANT

## 2020-12-08 PROCEDURE — 99999 PR PBB SHADOW E&M-EST. PATIENT-LVL III: ICD-10-PCS | Mod: PBBFAC,,, | Performed by: PHYSICIAN ASSISTANT

## 2020-12-08 PROCEDURE — 99999 PR PBB SHADOW E&M-EST. PATIENT-LVL III: CPT | Mod: PBBFAC,,, | Performed by: PHYSICIAN ASSISTANT

## 2020-12-08 PROCEDURE — 99213 OFFICE O/P EST LOW 20 MIN: CPT | Mod: PBBFAC | Performed by: PHYSICIAN ASSISTANT

## 2020-12-08 PROCEDURE — 86480 TB TEST CELL IMMUN MEASURE: CPT

## 2020-12-08 PROCEDURE — 99203 PR OFFICE/OUTPT VISIT, NEW, LEVL III, 30-44 MIN: ICD-10-PCS | Mod: S$PBB,,, | Performed by: PHYSICIAN ASSISTANT

## 2020-12-08 RX ORDER — DIAZEPAM 5 MG/1
TABLET ORAL
Qty: 2 TABLET | Refills: 0 | Status: SHIPPED | OUTPATIENT
Start: 2020-12-08 | End: 2021-03-01

## 2020-12-08 NOTE — LETTER
December 10, 2020      Delta Reyes MD  [address] [address]           [facility]  [address]  [facility] [address]  Phone: [phone]          Patient: Rivka Moreau   MR Number: [id number]   YOB: 1990   Date of Visit: 12/8/2020       Dear Dr. Delta Reyes:    Thank you for referring Rivka Moreau to me for evaluation. Attached you will find relevant portions of my assessment and plan of care.    If you have questions, please do not hesitate to call me. I look forward to following Rivka Moreau along with you.    Sincerely,    Angy Jacobs PA-C    Enclosure  CC:  No Recipients    If you would like to receive this communication electronically, please contact [email or web address] or [phone] to request more information on [employer] access.    For providers and/or their staff who would like to refer a patient to [facility], please contact us through our one-stop-shop provider referral line, [facility], at [phone].    If you feel you have received this communication in error or would no longer like to receive these types of communications, please e-mail [email or web address]

## 2020-12-08 NOTE — Clinical Note
Just KYLE for you--you see her soon.  I saw Rivka for NP visit. Don't think she has MS but getting spine imaging and mimics for further eval. Doesn't sound like she actually had true demyelinating event.  Has 3 non specific lesions(could be from her headaches I think). Also seems to have back issues that probably causing hyporeflexia and quiet babinskis.  That's all for now--I'll keep you posted on MRI's

## 2020-12-08 NOTE — PROGRESS NOTES
"Neurology Consultation    Reason for consult:  Possible MS            History of present illness:    ER: 11/22/2020. Patient experienceing face twitching on R side intermittently was becoming more frequent. She felt unbalaced. Felt like she was swaying. Feels like "brain doesn't fit in my skull" and feels "numb and fuzzy". She did not feel weak but rather unbalanced. Feels like vision was on "autofocus" -feels like peripheral vision was impaired---lasted 4 hours.     In the months prior has had tingling in hand and feet and "raindrops" sensation on arms and legs(intermittent). 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        Denies bladder or bowel issues, swallowing issues   Headaches -behind the "socket" or 'all over" --headaches started late teens. Several times a week.     Today: achiness feeling in L UE and LE.   She states she has random heart palpitations that occur sporadically --2-3 times a week.   Anxiety since ER visit  Feels like she occasionally slurrs her words      Working full time as a . Lives with fiance and two kids           Review of systems:   CONSTITUTIONAL: No weight loss, fever, chills, weakness or fatigue.   HEENT: Eyes: No visual loss, blurred vision, double vision or yellow sclerae. Ears, Nose, Throat: No hearing loss, sneezing, congestion, runny nose or sore throat.   SKIN: No rash or itching.   CARDIOVASCULAR: No chest pain, chest pressure or chest discomfort. + palpitations - edema.   RESPIRATORY: No shortness of breath, cough or sputum.   GASTROINTESTINAL: No anorexia, nausea, vomiting or diarrhea. No abdominal pain or blood.   GENITOURINARY: No dysuria, frequency or retention.   NEUROLOGICAL: As in HPI   MUSCULOSKELETAL: No muscle pain, + back pain(long standing),- joint pain or- stiffness.   HEMATOLOGIC: No anemia, bleeding or bruising.   LYMPHATICS: " No enlarged nodes.  PSYCHIATRIC: No history of depression or anxiety.   ENDOCRINOLOGIC: No reports of sweating, cold or heat intolerance. No polyuria or polydipsia.     Past Medical History:   Diagnosis Date    Shingles rash        Past Surgical History:   Procedure Laterality Date    EYE SURGERY      OOPHORECTOMY Left        History reviewed. No pertinent family history.    Social History     Socioeconomic History    Marital status: Single     Spouse name: Not on file    Number of children: Not on file    Years of education: Not on file    Highest education level: Not on file   Occupational History    Not on file   Social Needs    Financial resource strain: Not on file    Food insecurity     Worry: Not on file     Inability: Not on file    Transportation needs     Medical: Not on file     Non-medical: Not on file   Tobacco Use    Smoking status: Never Smoker    Smokeless tobacco: Never Used   Substance and Sexual Activity    Alcohol use: No    Drug use: No    Sexual activity: Yes   Lifestyle    Physical activity     Days per week: Not on file     Minutes per session: Not on file    Stress: Not at all   Relationships    Social connections     Talks on phone: Not on file     Gets together: Not on file     Attends Spiritism service: Not on file     Active member of club or organization: Not on file     Attends meetings of clubs or organizations: Not on file     Relationship status: Not on file   Other Topics Concern    Not on file   Social History Narrative    Not on file     See EPIC  Review of patient's allergies indicates:  No Known Allergies      Physical Exam    Vitals:    12/08/20 0810   BP: 101/70   Pulse: 64   Height: 5' (1.524 m)     Timed walk: 4.2s without assist    In general, the patient is well nourished-BMI 46.93.     Fundi are normal bilaterally.    MENTAL STATUS: language is fluent, normal verbal comprehension, short-term and remote memory is intact, attention is normal, patient  is alert and oriented x 3, fund of knowlege is appropriate by vocabulary.     CRANIAL NERVE EXAM:  There is no CHAIM.  Extraocular muscles are intact. Pupils are equal, round, and reactive to light. No facial asymmetry. Facial sensation is intact bilaterally. There is no dysarthria. Uvula is midline, and palate moves symmetrically. Shoulder shrug intact bilaterlly. Tongue protrusion is midline. Hearing is grossly intact. Neck is supple. Acuity: 20/20 OD and OS with Snellen hand held chart    MOTOR EXAM: Normal bulk and tone throughout UE and LE bilaterally.   No pronator drift; rapid sequential movements are normal; Strength is  5/5 in all groups in the lower extremities and upper extremities;    REFLEXES: 2+ and symmetric throughout in UE extremeties,  1+ in LE, ; toes are equivocal    SENSORY EXAM: Normal to light touch and vibration.    COORDINATION: Normal finger-to-nose exam     GAIT: Narrow based and stable, able to hop on each foot independently, tandem normal(hindered mildy by body habitus.         IMAGING (personally reviewed with patient in clinic):    MRI Brain Demyelinating W W/O Contrast  Order: [id number]  Status:  Final result   Visible to patient:  No (inaccessible in Patient Portal) Next appt:  03/01/2021 at 08:20 AM in [facility] (Delta Reyes MD)  Details    Reading Physician Reading Date Result Priority   Catalino De La Torre MD  [phone] 11/23/2020 STAT      Narrative & Impression     EXAMINATION:  MRI BRAIN DEMYELINATING W/ WO CONTRAST     CLINICAL HISTORY:  Multiple sclerosis, baseline;     TECHNIQUE:  Multiplanar multisequence MR imaging of the brain was performed before and after the administration of 10 mL Gadavist  intravenous contrast.     COMPARISON:  None.     FINDINGS:  Intracranial compartment:     Three small foci of T2/FLAIR increased signal intensity are noted in the white matter in the right occipital lobe (series 6, image 47) left parietal lobe (series 6, image 95) and  right inferior frontal lobe (series 6, image 134).  No mass lesion, acute hemorrhage, edema or acute infarct.  No abnormal enhancement.     Ventricles and sulci are normal in size for age without evidence of hydrocephalus.     No extra-axial blood or fluid collections.     Normal vascular flow voids are preserved.     Skull/extracranial contents (limited evaluation): Bone marrow signal intensity is normal.     Impression:     Three small foci of T2/FLAIR increased signal intensity are noted in the supratentorial white matter as above.  None of these demonstrate diffusion restriction or abnormal enhancement.  Finding is nonspecific but small plaques related to demyelinating disease are not excluded.  No pericallosal white matter abnormality seen.  No infratentorial lesions identified.  Recommend neurology consultation and correlation with CSF analysis, as clinically indicated, regarding clinical suspicion for multiple sclerosis.        Electronically signed by: Catalino De La Torre MD  Date:                                            11/23/2020  Time:                                           01:42                 LABS:  Lab Results   Component Value Date    WBC 7.52 12/08/2020    HGB 13.0 12/08/2020    HCT 41.0 12/08/2020    MCV 89 12/08/2020     12/08/2020     CMP  Sodium   Date Value Ref Range Status   12/08/2020 139 136 - 145 mmol/L Final     Potassium   Date Value Ref Range Status   12/08/2020 4.3 3.5 - 5.1 mmol/L Final     Chloride   Date Value Ref Range Status   12/08/2020 106 95 - 110 mmol/L Final     CO2   Date Value Ref Range Status   12/08/2020 24 23 - 29 mmol/L Final     Glucose   Date Value Ref Range Status   12/08/2020 96 70 - 110 mg/dL Final     BUN   Date Value Ref Range Status   12/08/2020 10 6 - 20 mg/dL Final     Creatinine   Date Value Ref Range Status   12/08/2020 0.7 0.5 - 1.4 mg/dL Final     Calcium   Date Value Ref Range Status   12/08/2020 8.9 8.7 - 10.5 mg/dL Final     Total Protein   Date  Value Ref Range Status   12/08/2020 7.3 6.0 - 8.4 g/dL Final     Albumin   Date Value Ref Range Status   12/08/2020 3.9 3.5 - 5.2 g/dL Final     Total Bilirubin   Date Value Ref Range Status   12/08/2020 0.6 0.1 - 1.0 mg/dL Final     Comment:     For infants and newborns, interpretation of results should be based  on gestational age, weight and in agreement with clinical  observations.  Premature Infant recommended reference ranges:  Up to 24 hours.............<8.0 mg/dL  Up to 48 hours............<12.0 mg/dL  3-5 days..................<15.0 mg/dL  6-29 days.................<15.0 mg/dL       Alkaline Phosphatase   Date Value Ref Range Status   12/08/2020 92 55 - 135 U/L Final     AST   Date Value Ref Range Status   12/08/2020 13 10 - 40 U/L Final     ALT   Date Value Ref Range Status   12/08/2020 10 10 - 44 U/L Final     Anion Gap   Date Value Ref Range Status   12/08/2020 9 8 - 16 mmol/L Final     eGFR if    Date Value Ref Range Status   12/08/2020 >60.0 >60 mL/min/1.73 m^2 Final     eGFR if non    Date Value Ref Range Status   12/08/2020 >60.0 >60 mL/min/1.73 m^2 Final     Comment:     Calculation used to obtain the estimated glomerular filtration  rate (eGFR) is the CKD-EPI equation.                 ASSESSMENT:        1.  Abnormal Reflexes     2.  Abnormal Brain MRI      3. Subjective sensory symptoms                   DISCUSSION:   Patient is a 29 yo female who presents for possible MS. She was seen in ER in November 2020 after several hours of facial twitching, sensory symptoms, balance issues. MRI performed of Brain which show a few T2Caout lesions(non enhancing). She states in the months prior she experienced intermittent tingling of bilateral hands and feet. She had a history of mid thoracic back pain along with headaches several times weekly. Her exam is essentially normal apart from hyporeflexia in LE's and equivocal Babinski. It doesn't appear that she has experienced a  true demyelinating event which lasted greater than 24-48 hours at this time.  I would like to further explore possible MS with labs for Mimics and spine imaging. Will consider need for LP(discussed with patient) at return visit.       RECOMMENDATIONS:  1. Labs today           2. MRI of C and T spine  3. Referral to our partners in Neurology for headache management                                     Abnormal MRI  -     CBC Auto Differential; Future; Expected date: 12/08/2020  -     Comprehensive Metabolic Panel; Future  -     [place]-Suppressor Ratio; Future; Expected date: 12/08/2020  -     MRI Cervical Spine Demyelinating W W/O Contrast; Future; Expected date: 12/08/2020  -     MRI Thoracic Spine Demyelinating W W/O Contrast; Future; Expected date: 12/08/2020  -     PURA Screen w/Reflex; Future; Expected date: 12/08/2020  -     Angiotensin Converting Enzyme; Future; Expected date: 12/08/2020  -     Anti Sm/RNP Antibody; Future; Expected date: 12/08/2020  -     Anti-DNA Ab, Double-Stranded; Future; Expected date: 12/08/2020  -     Anti-Scleroderma Antibody; Future; Expected date: 12/08/2020  -     B. burgdorferi Abs (Lyme Disease); Future; Expected date: 12/08/2020  -     Cardiolipin antibody; Future; Expected date: 12/08/2020  -     Rheumatoid Factor; Future; Expected date: 12/08/2020  -     RPR; Future; Expected date: 12/08/2020  -     Sjogrens syndrome-A extractable nuclear antibody; Future; Expected date: 12/08/2020  -     Sjogrens syndrome-B extractable nuclear antibody; Future; Expected date: 12/08/2020  -     Hepatitis C Antibody; Future; Expected date: 12/08/2020  -     diazePAM (VALIUM) 5 MG tablet; Take 1 to 2 tabs po 2 hours prior to MRI  Dispense: 2 tablet; Refill: 0  -     Vitamin D; Future  -     Quantiferon Gold TB; Future; Expected date: 12/08/2020    Nonintractable headache, unspecified chronicity pattern, unspecified headache type  -     Ambulatory referral/consult to Neurology    Counseling  regarding goals of care    Abnormal reflexes of lower extremity      Time face to face with patient--90 minutes  Follow up in about 1 month (around 1/8/2021) for Dr. Fritz.  Patient agreed to POC today.    Attending, Dr. Llanes, was available during today's encounter.     Angy Jacobs PA-C  [facility]

## 2020-12-08 NOTE — TELEPHONE ENCOUNTER
----- Message from Jackie Louie sent at 12/8/2020  7:40 AM CST -----  Regarding: running late  Contact: Pt  Patient is running late for their appointment and would like to know if they can still be seen.     Please advise    Phone [phone]

## 2020-12-09 LAB
GAMMA INTERFERON BACKGROUND BLD IA-ACNC: 0.41 IU/ML
M TB IFN-G CD4+ BCKGRND COR BLD-ACNC: -0.06 IU/ML
MITOGEN IGNF BCKGRD COR BLD-ACNC: 7.64 IU/ML
TB GOLD PLUS: NEGATIVE
TB2 - NIL: -0.21 IU/ML

## 2020-12-13 ENCOUNTER — PATIENT MESSAGE (OUTPATIENT)
Dept: NEUROLOGY | Facility: CLINIC | Age: 30
End: 2020-12-13

## 2020-12-14 ENCOUNTER — HOSPITAL ENCOUNTER (OUTPATIENT)
Dept: RADIOLOGY | Facility: HOSPITAL | Age: 30
Discharge: HOME | End: 2020-12-14
Attending: PHYSICIAN ASSISTANT
Payer: MEDICAID

## 2020-12-14 DIAGNOSIS — R93.89 ABNORMAL MRI: ICD-10-CM

## 2020-12-14 PROCEDURE — 72157 MRI THORACIC SPINE DEMYELINATING W W/O CONTRAST: ICD-10-PCS | Mod: 26,,, | Performed by: RADIOLOGY

## 2020-12-14 PROCEDURE — 72157 MRI CHEST SPINE W/O & W/DYE: CPT | Mod: 26,,, | Performed by: RADIOLOGY

## 2020-12-14 PROCEDURE — 72156 MRI NECK SPINE W/O & W/DYE: CPT | Mod: TC

## 2020-12-14 PROCEDURE — 25500020 PHARM REV CODE 255: Performed by: PHYSICIAN ASSISTANT

## 2020-12-14 PROCEDURE — A9585 GADOBUTROL INJECTION: HCPCS | Performed by: PHYSICIAN ASSISTANT

## 2020-12-14 PROCEDURE — 72157 MRI CHEST SPINE W/O & W/DYE: CPT | Mod: TC

## 2020-12-14 PROCEDURE — 72156 MRI CERVICAL SPINE DEMYELINATING W W/O CONTRAST: ICD-10-PCS | Mod: 26,,, | Performed by: RADIOLOGY

## 2020-12-14 PROCEDURE — 72156 MRI NECK SPINE W/O & W/DYE: CPT | Mod: 26,,, | Performed by: RADIOLOGY

## 2020-12-14 RX ORDER — GADOBUTROL 604.72 MG/ML
6 INJECTION INTRAVENOUS
Status: COMPLETED | OUTPATIENT
Start: 2020-12-14 | End: 2020-12-14

## 2020-12-14 RX ADMIN — GADOBUTROL 6 ML: 604.72 INJECTION INTRAVENOUS at 07:12

## 2020-12-15 ENCOUNTER — PATIENT MESSAGE (OUTPATIENT)
Dept: NEUROLOGY | Facility: CLINIC | Age: 30
End: 2020-12-15

## 2020-12-16 ENCOUNTER — PATIENT MESSAGE (OUTPATIENT)
Dept: NEUROLOGY | Facility: CLINIC | Age: 30
End: 2020-12-16

## 2020-12-22 ENCOUNTER — OFFICE VISIT (OUTPATIENT)
Dept: NEUROLOGY | Facility: CLINIC | Age: 30
End: 2020-12-22
Payer: MEDICAID

## 2020-12-22 DIAGNOSIS — M50.30 MILD DEGENERATION OF CERVICAL INTERVERTEBRAL DISC: ICD-10-CM

## 2020-12-22 DIAGNOSIS — R90.82 WHITE MATTER ABNORMALITY ON MRI OF BRAIN: Primary | ICD-10-CM

## 2020-12-22 PROCEDURE — 99215 PR OFFICE/OUTPT VISIT, EST, LEVL V, 40-54 MIN: ICD-10-PCS | Mod: 95,,, | Performed by: PSYCHIATRY & NEUROLOGY

## 2020-12-22 PROCEDURE — 99215 OFFICE O/P EST HI 40 MIN: CPT | Mod: 95,,, | Performed by: PSYCHIATRY & NEUROLOGY

## 2020-12-31 ENCOUNTER — OFFICE VISIT (OUTPATIENT)
Dept: URGENT CARE | Facility: CLINIC | Age: 30
End: 2020-12-31
Payer: MEDICAID

## 2020-12-31 ENCOUNTER — PATIENT MESSAGE (OUTPATIENT)
Dept: ADMINISTRATIVE | Facility: OTHER | Age: 30
End: 2020-12-31

## 2020-12-31 VITALS
TEMPERATURE: 98 F | OXYGEN SATURATION: 98 % | HEART RATE: 78 BPM | SYSTOLIC BLOOD PRESSURE: 110 MMHG | HEIGHT: 60 IN | BODY MASS INDEX: 47.18 KG/M2 | DIASTOLIC BLOOD PRESSURE: 80 MMHG | RESPIRATION RATE: 18 BRPM | WEIGHT: 240.31 LBS

## 2020-12-31 DIAGNOSIS — Z11.9 SCREENING EXAMINATION FOR UNSPECIFIED INFECTIOUS DISEASE: ICD-10-CM

## 2020-12-31 DIAGNOSIS — U07.1 COVID-19 VIRUS INFECTION: Primary | ICD-10-CM

## 2020-12-31 DIAGNOSIS — R43.2 LOSS OF TASTE: ICD-10-CM

## 2020-12-31 LAB
CTP QC/QA: YES
SARS-COV-2 RDRP RESP QL NAA+PROBE: POSITIVE

## 2020-12-31 PROCEDURE — U0002: ICD-10-PCS | Mod: QW,S$GLB,, | Performed by: NURSE PRACTITIONER

## 2020-12-31 PROCEDURE — U0002 COVID-19 LAB TEST NON-CDC: HCPCS | Mod: QW,S$GLB,, | Performed by: NURSE PRACTITIONER

## 2020-12-31 PROCEDURE — 99203 PR OFFICE/OUTPT VISIT, NEW, LEVL III, 30-44 MIN: ICD-10-PCS | Mod: TIER,S$GLB,, | Performed by: NURSE PRACTITIONER

## 2020-12-31 PROCEDURE — 99203 OFFICE O/P NEW LOW 30 MIN: CPT | Mod: TIER,S$GLB,, | Performed by: NURSE PRACTITIONER

## 2020-12-31 NOTE — LETTER
[address] [facility] ? [place], [address] ? Phone [phone] ? Fax [phone]           Return to Work/School    Patient: Rivka Moreau  YOB: 1990   Date: 12/31/2020      To Whom It May Concern:     Rivka Moreau was in contact with/seen in my office on 12/31/2020. COVID-19 is present in our communities across the state. Not all patients are eligible or appropriate to be tested. In this situation, your employee meets the following criteria:     Rivka Moreau has met the criteria for COVID-19 testing and has a POSITIVE result. She can return to work once they are asymptomatic for 24 hours without the use of fever reducing medications AND at least ten days from the start of symptoms (or from the first positive result if they have no symptoms).      If you have any questions or concerns, or if I can be of further assistance, please do not hesitate to contact me.     Sincerely,        Danae Beck NP

## 2020-12-31 NOTE — PROGRESS NOTES
Subjective:       Patient ID: Rivka Moreau is a 30 y.o. female.    Vitals:  height is 5' (1.524 m) and weight is 109 kg (240 lb 4.8 oz). Her temperature is 97.9 °F (36.6 °C). Her blood pressure is 110/80 and her pulse is 78. Her respiration is 18 and oxygen saturation is 98%.     Chief Complaint: COVID-19 Concerns    Patient is here today just to get covid swab, because she is traveling out of state to [place].  Reports allergy like symptoms and loss of taste and smell with dry cough x2 days.  Denies fever.  Denies shortness of breath or chest pain.     Other  This is a new problem. The current episode started today. The problem has been unchanged. Pertinent negatives include no arthralgias, chest pain, chills, congestion, coughing, fatigue, fever, headaches, joint swelling, myalgias, nausea, rash, sore throat, vertigo, vomiting or weakness.       Constitution: Negative for chills, fatigue and fever.   HENT: Negative for congestion and sore throat.    Neck: Negative for painful lymph nodes.   Cardiovascular: Negative for chest pain and leg swelling.   Eyes: Negative for double vision and blurred vision.   Respiratory: Negative for cough and shortness of breath.    Gastrointestinal: Negative for nausea, vomiting and diarrhea.   Genitourinary: Negative for dysuria, frequency, urgency and history of kidney stones.   Musculoskeletal: Negative for joint pain, joint swelling, muscle cramps and muscle ache.   Skin: Negative for color change, pale, rash and bruising.   Allergic/Immunologic: Positive for seasonal allergies.   Neurological: Negative for dizziness, history of vertigo, light-headedness, passing out and headaches.   Hematologic/Lymphatic: Negative for swollen lymph nodes.   Psychiatric/Behavioral: Negative for nervous/anxious, sleep disturbance and depression. The patient is not nervous/anxious.        Objective:      Physical Exam   Constitutional: She is oriented to person, place, and time. She appears  well-developed. She is cooperative.  Non-toxic appearance. She does not appear ill. No distress.   HENT:   Head: Normocephalic and atraumatic.   Ears:   Right Ear: Hearing and external ear normal.   Left Ear: Hearing and external ear normal.   Nose: Nose normal. No mucosal edema, rhinorrhea or nasal deformity. No epistaxis. Right sinus exhibits no maxillary sinus tenderness and no frontal sinus tenderness. Left sinus exhibits no maxillary sinus tenderness and no frontal sinus tenderness.   Mouth/Throat: Uvula is midline, oropharynx is clear and moist and mucous membranes are normal. No trismus in the jaw. Normal dentition. No uvula swelling. No oropharyngeal exudate, posterior oropharyngeal edema or posterior oropharyngeal erythema.   Eyes: Conjunctivae and lids are normal. No scleral icterus.   Neck: Trachea normal, full passive range of motion without pain and phonation normal. Neck supple. No neck rigidity. No edema and no erythema present.   Cardiovascular: Normal rate, regular rhythm, normal heart sounds and normal pulses.   Pulmonary/Chest: Effort normal and breath sounds normal. No tachypnea. No respiratory distress. She has no decreased breath sounds. She has no wheezes. She has no rhonchi.    Comments: Speaking in full and clear sentences with room air pulse ox of 98%    Abdominal: Normal appearance.   Musculoskeletal: Normal range of motion.         General: No deformity.   Neurological: She is alert and oriented to person, place, and time. She exhibits normal muscle tone. Coordination normal.   Skin: Skin is warm, dry, intact, not diaphoretic and not pale. Psychiatric: Her speech is normal and behavior is normal. Judgment and thought content normal.   Nursing note and vitals reviewed.    Results for orders placed or performed in visit on 12/31/20   POCT COVID-19 Rapid Screening   Result Value Ref Range    POC Rapid COVID Positive (A) Negative     Acceptable Yes          Assessment:       1.  COVID-19 virus infection    2. Screening examination for unspecified infectious disease    3. Loss of taste        Plan:         COVID-19 virus infection  -     Ambulatory referral/consult to [facility]  -     pulse oximeter (PULSE OXIMETER) device; by Apply Externally route 2 (two) times a day. Use twice daily at 8 AM and 3 PM and record the value in [employer] as directed.  Dispense: 1 each; Refill: 0  -     COVID-19 Surveillance Program    Screening examination for unspecified infectious disease  -     POCT COVID-19 Rapid Screening    Loss of taste      Patient Instructions   Your test was POSITIVE for COVID-19 (coronavirus).       Please isolate yourself at home.  You may leave home and/or return to work once the following conditions are met:    If you were not hospitalized and are not severely immunocompromised*:   More than 10 days since symptoms first appeared AND   More than 24 hours fever free without medications AND   Symptoms have improved     If you were hospitalized OR are severely immunocompromised*:   More than 20 days since symptoms first appeared   More than 24 hours fever free without medications   Symptoms have improved    If you had no symptoms but tested positive:   More than 10 days since the date of the first positive test (20 days if severely immunocompromised).   If you develop symptoms, then use the guidelines above.     *Definition of severely immunocompromised:  - Current chemotherapy for cancer  - Untreated HIV with CD4 count less than 200  - Combined primary immunodeficiency disorder  - Prednisone more than 20 mg per day for more than 14 days  - Post-transplant patients    Additional instructions:   Separate yourself from other people and animals in your home.   Call ahead before visiting your doctor.   Wear a facemask when around others.   Cover your coughs and sneezes.   Wash your hands often with soap and water; hand  can be used, too.   Avoid sharing personal  household items.   Wipe down surfaces used daily.   Monitor your symptoms. Seek prompt medical attention if your illness is worsening (e.g., difficulty breathing).    Before seeking care, call your healthcare provider.   If you have a medical emergency and need to call 911, notify the dispatch personnel that you have, or are being evaluated for COVID-19. If possible, put on a facemask before emergency medical services arrive.        Contact Tracing    As one of the next steps, you will receive a call or text from the [place] Department of Health ([facility]) COVID-19 Tracing Team. See the contact information below so you know not to ignore the health departments call. It is important that you contact them back immediately so they can help.      Contact Tracer Number:  [phone]  Caller ID for most carriers: [place] Dept Health     What is contact tracing?  · Contact tracing is a process that helps identify everyone who has been in close contact with an infected person. Contact tracers let those people know they may have been exposed and guide them on next steps. Confidentiality is important for everyone; no one will be told who may have exposed them to the virus.  · Your involvement is important. The more we know about where and how this virus is spreading, the better chance we have at stopping it from spreading further.  What does exposure mean?  · Exposure means you have been within 6 feet for more than 15 minutes with a person who has or had COVID-19.  What kind of questions do the contact tracers ask?  · A contact tracer will confirm your basic contact information including name, address, phone number, and next of kin, as well as asking about any symptoms you may have had. Theyll also ask you how you think you may have gotten sick, such as places where you may have been exposed to the virus, and people you were with. Those names will never be shared with anyone outside of that call, and will only be used to  help trace and stop the spread of the virus.   I have privacy concerns. How will the state use my information?  · Your privacy about your health is important. All calls are completed using call centers that use the appropriate health privacy protection measures (HIPAA compliance), meaning that your patient information is safe. No one will ever ask you any questions related to immigration status. Your health comes first.   Do I have to participate?  · You do not have to participate, but we strongly encourage you to. Contact tracing can help us catch and control new outbreaks as theyre developing to keep your friends and family safe.   What if I dont hear from anyone?  · If you dont receive a call within 24 hours, you can call the number above right away to inquire about your status. That line is open from 8:00 am - 8:00 p.m., 7 days a week.  Contact tracing saves lives! Together, we have the power to beat this virus and keep our loved ones and neighbors safe.    For more information see CDC link below.      https://www.cdc.gov/coronavirus/2019-ncov/hcp/guidance-prevent-spread.html#precautions        Sources:  [facility], [place] [facility] and [facility]

## 2020-12-31 NOTE — PATIENT INSTRUCTIONS
Your test was POSITIVE for COVID-19 (coronavirus).       Please isolate yourself at home.  You may leave home and/or return to work once the following conditions are met:    If you were not hospitalized and are not severely immunocompromised*:   More than 10 days since symptoms first appeared AND   More than 24 hours fever free without medications AND   Symptoms have improved     If you were hospitalized OR are severely immunocompromised*:   More than 20 days since symptoms first appeared   More than 24 hours fever free without medications   Symptoms have improved    If you had no symptoms but tested positive:   More than 10 days since the date of the first positive test (20 days if severely immunocompromised).   If you develop symptoms, then use the guidelines above.     *Definition of severely immunocompromised:  - Current chemotherapy for cancer  - Untreated HIV with CD4 count less than 200  - Combined primary immunodeficiency disorder  - Prednisone more than 20 mg per day for more than 14 days  - Post-transplant patients    Additional instructions:   Separate yourself from other people and animals in your home.   Call ahead before visiting your doctor.   Wear a facemask when around others.   Cover your coughs and sneezes.   Wash your hands often with soap and water; hand  can be used, too.   Avoid sharing personal household items.   Wipe down surfaces used daily.   Monitor your symptoms. Seek prompt medical attention if your illness is worsening (e.g., difficulty breathing).    Before seeking care, call your healthcare provider.   If you have a medical emergency and need to call 911, notify the dispatch personnel that you have, or are being evaluated for COVID-19. If possible, put on a facemask before emergency medical services arrive.        Contact Tracing    As one of the next steps, you will receive a call or text from the [place] Department of Health ([facility]) COVID-19 Tracing Team.  See the contact information below so you know not to ignore the health departments call. It is important that you contact them back immediately so they can help.      Contact Tracer Number:  [phone]  Caller ID for most carriers: [place] Dept Health     What is contact tracing?  · Contact tracing is a process that helps identify everyone who has been in close contact with an infected person. Contact tracers let those people know they may have been exposed and guide them on next steps. Confidentiality is important for everyone; no one will be told who may have exposed them to the virus.  · Your involvement is important. The more we know about where and how this virus is spreading, the better chance we have at stopping it from spreading further.  What does exposure mean?  · Exposure means you have been within 6 feet for more than 15 minutes with a person who has or had COVID-19.  What kind of questions do the contact tracers ask?  · A contact tracer will confirm your basic contact information including name, address, phone number, and next of kin, as well as asking about any symptoms you may have had. Theyll also ask you how you think you may have gotten sick, such as places where you may have been exposed to the virus, and people you were with. Those names will never be shared with anyone outside of that call, and will only be used to help trace and stop the spread of the virus.   I have privacy concerns. How will the state use my information?  · Your privacy about your health is important. All calls are completed using call centers that use the appropriate health privacy protection measures (HIPAA compliance), meaning that your patient information is safe. No one will ever ask you any questions related to immigration status. Your health comes first.   Do I have to participate?  · You do not have to participate, but we strongly encourage you to. Contact tracing can help us catch and control new outbreaks as  theyre developing to keep your friends and family safe.   What if I dont hear from anyone?  · If you dont receive a call within 24 hours, you can call the number above right away to inquire about your status. That line is open from 8:00 am - 8:00 p.m., 7 days a week.  Contact tracing saves lives! Together, we have the power to beat this virus and keep our loved ones and neighbors safe.    For more information see CDC link below.      https://www.cdc.gov/coronavirus/2019-ncov/hcp/guidance-prevent-spread.html#precautions        Sources:  CDC, [facility] and [facility]

## 2021-01-01 ENCOUNTER — PATIENT MESSAGE (OUTPATIENT)
Dept: ADMINISTRATIVE | Facility: CLINIC | Age: 31
End: 2021-01-01

## 2021-01-01 ENCOUNTER — TELEPHONE (OUTPATIENT)
Dept: ADMINISTRATIVE | Facility: CLINIC | Age: 31
End: 2021-01-01

## 2021-01-02 ENCOUNTER — TELEPHONE (OUTPATIENT)
Dept: ADMINISTRATIVE | Facility: CLINIC | Age: 31
End: 2021-01-02

## 2021-01-03 ENCOUNTER — NURSE TRIAGE (OUTPATIENT)
Dept: ADMINISTRATIVE | Facility: CLINIC | Age: 31
End: 2021-01-03

## 2021-01-04 ENCOUNTER — PATIENT MESSAGE (OUTPATIENT)
Dept: ADMINISTRATIVE | Facility: CLINIC | Age: 31
End: 2021-01-04

## 2021-01-04 ENCOUNTER — NURSE TRIAGE (OUTPATIENT)
Dept: ADMINISTRATIVE | Facility: CLINIC | Age: 31
End: 2021-01-04

## 2021-01-05 ENCOUNTER — TELEPHONE (OUTPATIENT)
Dept: ADMINISTRATIVE | Facility: OTHER | Age: 31
End: 2021-01-05

## 2021-01-05 ENCOUNTER — NURSE TRIAGE (OUTPATIENT)
Dept: ADMINISTRATIVE | Facility: CLINIC | Age: 31
End: 2021-01-05

## 2021-01-06 ENCOUNTER — PATIENT MESSAGE (OUTPATIENT)
Dept: ADMINISTRATIVE | Facility: CLINIC | Age: 31
End: 2021-01-06

## 2021-01-07 ENCOUNTER — NURSE TRIAGE (OUTPATIENT)
Dept: ADMINISTRATIVE | Facility: CLINIC | Age: 31
End: 2021-01-07

## 2021-01-08 ENCOUNTER — NURSE TRIAGE (OUTPATIENT)
Dept: ADMINISTRATIVE | Facility: CLINIC | Age: 31
End: 2021-01-08

## 2021-02-25 ENCOUNTER — PATIENT OUTREACH (OUTPATIENT)
Dept: ADMINISTRATIVE | Facility: HOSPITAL | Age: 31
End: 2021-02-25

## 2021-03-01 ENCOUNTER — OFFICE VISIT (OUTPATIENT)
Dept: PRIMARY CARE CLINIC | Facility: CLINIC | Age: 31
End: 2021-03-01
Payer: MEDICAID

## 2021-03-01 VITALS
HEART RATE: 57 BPM | OXYGEN SATURATION: 100 % | RESPIRATION RATE: 18 BRPM | DIASTOLIC BLOOD PRESSURE: 74 MMHG | TEMPERATURE: 98 F | WEIGHT: 229.63 LBS | SYSTOLIC BLOOD PRESSURE: 110 MMHG | BODY MASS INDEX: 45.08 KG/M2 | HEIGHT: 60 IN

## 2021-03-01 DIAGNOSIS — E66.9 OBESITY, UNSPECIFIED CLASSIFICATION, UNSPECIFIED OBESITY TYPE, UNSPECIFIED WHETHER SERIOUS COMORBIDITY PRESENT: ICD-10-CM

## 2021-03-01 DIAGNOSIS — R00.2 PALPITATIONS: ICD-10-CM

## 2021-03-01 DIAGNOSIS — R07.89 ATYPICAL CHEST PAIN: ICD-10-CM

## 2021-03-01 DIAGNOSIS — J45.909 ASTHMA, UNSPECIFIED ASTHMA SEVERITY, UNSPECIFIED WHETHER COMPLICATED, UNSPECIFIED WHETHER PERSISTENT: Primary | ICD-10-CM

## 2021-03-01 DIAGNOSIS — F41.9 ANXIETY: ICD-10-CM

## 2021-03-01 PROBLEM — R93.89 ABNORMAL MRI: Status: RESOLVED | Noted: 2020-11-30 | Resolved: 2021-03-01

## 2021-03-01 PROCEDURE — 99214 OFFICE O/P EST MOD 30 MIN: CPT | Mod: S$PBB,,, | Performed by: FAMILY MEDICINE

## 2021-03-01 PROCEDURE — 99999 PR PBB SHADOW E&M-EST. PATIENT-LVL III: CPT | Mod: PBBFAC,,, | Performed by: FAMILY MEDICINE

## 2021-03-01 PROCEDURE — 99213 OFFICE O/P EST LOW 20 MIN: CPT | Mod: PBBFAC,PN | Performed by: FAMILY MEDICINE

## 2021-03-01 PROCEDURE — 99999 PR PBB SHADOW E&M-EST. PATIENT-LVL III: ICD-10-PCS | Mod: PBBFAC,,, | Performed by: FAMILY MEDICINE

## 2021-03-01 PROCEDURE — 99214 PR OFFICE/OUTPT VISIT, EST, LEVL IV, 30-39 MIN: ICD-10-PCS | Mod: S$PBB,,, | Performed by: FAMILY MEDICINE

## 2021-03-01 RX ORDER — METOPROLOL SUCCINATE 25 MG/1
25 TABLET, EXTENDED RELEASE ORAL DAILY
Qty: 30 TABLET | Refills: 5 | Status: SHIPPED | OUTPATIENT
Start: 2021-03-01 | End: 2021-05-21 | Stop reason: CLARIF

## 2021-03-05 ENCOUNTER — TELEPHONE (OUTPATIENT)
Dept: PRIMARY CARE CLINIC | Facility: CLINIC | Age: 31
End: 2021-03-05

## 2021-03-05 ENCOUNTER — PATIENT MESSAGE (OUTPATIENT)
Dept: PRIMARY CARE CLINIC | Facility: CLINIC | Age: 31
End: 2021-03-05

## 2021-03-05 DIAGNOSIS — I95.9 HYPOTENSION, UNSPECIFIED HYPOTENSION TYPE: Primary | ICD-10-CM

## 2021-03-08 ENCOUNTER — PATIENT MESSAGE (OUTPATIENT)
Dept: PRIMARY CARE CLINIC | Facility: CLINIC | Age: 31
End: 2021-03-08

## 2021-03-09 ENCOUNTER — TELEPHONE (OUTPATIENT)
Dept: PRIMARY CARE CLINIC | Facility: CLINIC | Age: 31
End: 2021-03-09

## 2021-03-16 ENCOUNTER — PATIENT MESSAGE (OUTPATIENT)
Dept: PRIMARY CARE CLINIC | Facility: CLINIC | Age: 31
End: 2021-03-16

## 2021-03-16 ENCOUNTER — OFFICE VISIT (OUTPATIENT)
Dept: PRIMARY CARE CLINIC | Facility: CLINIC | Age: 31
End: 2021-03-16
Payer: MEDICAID

## 2021-03-16 DIAGNOSIS — R07.89 ATYPICAL CHEST PAIN: Primary | ICD-10-CM

## 2021-03-16 DIAGNOSIS — F41.9 ANXIETY: ICD-10-CM

## 2021-03-16 DIAGNOSIS — I45.10 INCOMPLETE RIGHT BUNDLE BRANCH BLOCK: ICD-10-CM

## 2021-03-16 DIAGNOSIS — I95.9 HYPOTENSION, UNSPECIFIED HYPOTENSION TYPE: ICD-10-CM

## 2021-03-16 DIAGNOSIS — E66.9 OBESITY, UNSPECIFIED CLASSIFICATION, UNSPECIFIED OBESITY TYPE, UNSPECIFIED WHETHER SERIOUS COMORBIDITY PRESENT: ICD-10-CM

## 2021-03-16 DIAGNOSIS — J98.01 BRONCHOSPASM: ICD-10-CM

## 2021-03-16 DIAGNOSIS — J45.909 ASTHMA, UNSPECIFIED ASTHMA SEVERITY, UNSPECIFIED WHETHER COMPLICATED, UNSPECIFIED WHETHER PERSISTENT: ICD-10-CM

## 2021-03-16 DIAGNOSIS — R00.2 PALPITATIONS: ICD-10-CM

## 2021-03-16 PROCEDURE — 99213 PR OFFICE/OUTPT VISIT, EST, LEVL III, 20-29 MIN: ICD-10-PCS | Mod: 95,,, | Performed by: FAMILY MEDICINE

## 2021-03-16 PROCEDURE — 99213 OFFICE O/P EST LOW 20 MIN: CPT | Mod: 95,,, | Performed by: FAMILY MEDICINE

## 2021-03-22 ENCOUNTER — PATIENT MESSAGE (OUTPATIENT)
Dept: PRIMARY CARE CLINIC | Facility: CLINIC | Age: 31
End: 2021-03-22

## 2021-03-22 LAB
CV STRESS BASE HR: 82 BPM
DIASTOLIC BLOOD PRESSURE: 42 MMHG
OHS CV CPX 1 MINUTE RECOVERY HEART RATE: 151 BPM
OHS CV CPX 85 PERCENT MAX PREDICTED HEART RATE MALE: 152
OHS CV CPX ESTIMATED METS: 1010
OHS CV CPX MAX PREDICTED HEART RATE: 179
OHS CV CPX PATIENT IS FEMALE: 1
OHS CV CPX PATIENT IS MALE: 0
OHS CV CPX PEAK DIASTOLIC BLOOD PRESSURE: 95 MMHG
OHS CV CPX PEAK HEAR RATE: 173 BPM
OHS CV CPX PEAK RATE PRESSURE PRODUCT: NORMAL
OHS CV CPX PEAK SYSTOLIC BLOOD PRESSURE: 128 MMHG
OHS CV CPX PERCENT MAX PREDICTED HEART RATE ACHIEVED: 97
OHS CV CPX RATE PRESSURE PRODUCT PRESENTING: 9676
STRESS ECHO POST EXERCISE DUR MIN: 7 MINUTES
STRESS ECHO POST EXERCISE DUR SEC: 0 SECONDS
SYSTOLIC BLOOD PRESSURE: 118 MMHG

## 2021-03-24 ENCOUNTER — TELEPHONE (OUTPATIENT)
Dept: PRIMARY CARE CLINIC | Facility: CLINIC | Age: 31
End: 2021-03-24

## 2021-04-26 ENCOUNTER — PATIENT MESSAGE (OUTPATIENT)
Dept: RESEARCH | Facility: HOSPITAL | Age: 31
End: 2021-04-26

## 2021-05-31 ENCOUNTER — PATIENT MESSAGE (OUTPATIENT)
Dept: PSYCHIATRY | Facility: CLINIC | Age: 31
End: 2021-05-31

## 2021-06-28 ENCOUNTER — IMMUNIZATION (OUTPATIENT)
Dept: INTERNAL MEDICINE | Facility: CLINIC | Age: 31
End: 2021-06-28
Payer: MEDICAID

## 2021-06-28 DIAGNOSIS — Z23 NEED FOR VACCINATION: Primary | ICD-10-CM

## 2021-06-28 PROCEDURE — 91300 COVID-19, MRNA, LNP-S, PF, 30 MCG/0.3 ML DOSE VACCINE: CPT | Mod: PBBFAC

## 2021-07-22 ENCOUNTER — IMMUNIZATION (OUTPATIENT)
Dept: INTERNAL MEDICINE | Facility: CLINIC | Age: 31
End: 2021-07-22
Payer: MEDICAID

## 2021-07-22 DIAGNOSIS — Z23 NEED FOR VACCINATION: Primary | ICD-10-CM

## 2021-07-22 PROCEDURE — 91300 COVID-19, MRNA, LNP-S, PF, 30 MCG/0.3 ML DOSE VACCINE: CPT | Mod: PBBFAC

## 2021-07-22 PROCEDURE — 0002A COVID-19, MRNA, LNP-S, PF, 30 MCG/0.3 ML DOSE VACCINE: CPT | Mod: PBBFAC

## 2021-08-10 ENCOUNTER — OFFICE VISIT (OUTPATIENT)
Dept: PRIMARY CARE CLINIC | Facility: CLINIC | Age: 31
End: 2021-08-10
Payer: MEDICAID

## 2021-08-10 VITALS
TEMPERATURE: 99 F | RESPIRATION RATE: 18 BRPM | HEIGHT: 60 IN | WEIGHT: 228.38 LBS | DIASTOLIC BLOOD PRESSURE: 62 MMHG | OXYGEN SATURATION: 98 % | BODY MASS INDEX: 44.84 KG/M2 | SYSTOLIC BLOOD PRESSURE: 108 MMHG | HEART RATE: 75 BPM

## 2021-08-10 DIAGNOSIS — K40.20 NON-RECURRENT BILATERAL INGUINAL HERNIA WITHOUT OBSTRUCTION OR GANGRENE: ICD-10-CM

## 2021-08-10 DIAGNOSIS — M25.512 CHRONIC LEFT SHOULDER PAIN: Primary | ICD-10-CM

## 2021-08-10 DIAGNOSIS — G89.29 CHRONIC LEFT SHOULDER PAIN: Primary | ICD-10-CM

## 2021-08-10 PROCEDURE — 99999 PR PBB SHADOW E&M-EST. PATIENT-LVL IV: ICD-10-PCS | Mod: PBBFAC,,, | Performed by: FAMILY MEDICINE

## 2021-08-10 PROCEDURE — 99214 PR OFFICE/OUTPT VISIT, EST, LEVL IV, 30-39 MIN: ICD-10-PCS | Mod: S$PBB,,, | Performed by: FAMILY MEDICINE

## 2021-08-10 PROCEDURE — 99999 PR PBB SHADOW E&M-EST. PATIENT-LVL IV: CPT | Mod: PBBFAC,,, | Performed by: FAMILY MEDICINE

## 2021-08-10 PROCEDURE — 99214 OFFICE O/P EST MOD 30 MIN: CPT | Mod: S$PBB,,, | Performed by: FAMILY MEDICINE

## 2021-08-10 PROCEDURE — 99214 OFFICE O/P EST MOD 30 MIN: CPT | Mod: PBBFAC,PN | Performed by: FAMILY MEDICINE

## 2021-08-10 RX ORDER — IBUPROFEN 600 MG/1
600 TABLET ORAL 3 TIMES DAILY
Qty: 100 TABLET | Refills: 5 | Status: SHIPPED | OUTPATIENT
Start: 2021-08-10 | End: 2021-09-23 | Stop reason: SDUPTHER

## 2021-08-10 RX ORDER — CYCLOBENZAPRINE HCL 10 MG
10 TABLET ORAL 3 TIMES DAILY PRN
Qty: 30 TABLET | Refills: 5 | Status: SHIPPED | OUTPATIENT
Start: 2021-08-10 | End: 2021-08-20

## 2021-08-18 ENCOUNTER — TELEPHONE (OUTPATIENT)
Dept: PRIMARY CARE CLINIC | Facility: CLINIC | Age: 31
End: 2021-08-18
Payer: MEDICAID

## 2021-08-18 DIAGNOSIS — M25.512 CHRONIC LEFT SHOULDER PAIN: Primary | ICD-10-CM

## 2021-08-18 DIAGNOSIS — G89.29 CHRONIC LEFT SHOULDER PAIN: Primary | ICD-10-CM

## 2021-08-18 NOTE — TELEPHONE ENCOUNTER
----- Message from Isatu Naranjo sent at 8/17/2021  3:16 PM CDT -----  Contact: self/[phone]  [facility] called, stated that insurance wont cover mri, if is possible to place order for xray. Thank you

## 2021-08-22 DIAGNOSIS — M25.512 LEFT SHOULDER PAIN, UNSPECIFIED CHRONICITY: Primary | ICD-10-CM

## 2021-08-27 ENCOUNTER — PATIENT MESSAGE (OUTPATIENT)
Dept: PRIMARY CARE CLINIC | Facility: CLINIC | Age: 31
End: 2021-08-27

## 2021-08-27 DIAGNOSIS — G89.29 CHRONIC LEFT SHOULDER PAIN: Primary | ICD-10-CM

## 2021-08-27 DIAGNOSIS — M25.512 CHRONIC LEFT SHOULDER PAIN: Primary | ICD-10-CM

## 2021-09-01 ENCOUNTER — PATIENT MESSAGE (OUTPATIENT)
Dept: PSYCHIATRY | Facility: CLINIC | Age: 31
End: 2021-09-01

## 2021-09-02 ENCOUNTER — PATIENT MESSAGE (OUTPATIENT)
Dept: PSYCHIATRY | Facility: CLINIC | Age: 31
End: 2021-09-02

## 2021-09-08 RX ORDER — CYCLOBENZAPRINE HCL 10 MG
10 TABLET ORAL 3 TIMES DAILY PRN
Qty: 60 TABLET | Refills: 1 | Status: SHIPPED | OUTPATIENT
Start: 2021-09-08 | End: 2022-03-16

## 2021-09-13 ENCOUNTER — TELEPHONE (OUTPATIENT)
Dept: SURGERY | Facility: CLINIC | Age: 31
End: 2021-09-13

## 2021-09-23 ENCOUNTER — OFFICE VISIT (OUTPATIENT)
Dept: PRIMARY CARE CLINIC | Facility: CLINIC | Age: 31
End: 2021-09-23
Payer: MEDICAID

## 2021-09-23 DIAGNOSIS — J45.909 ASTHMA, UNSPECIFIED ASTHMA SEVERITY, UNSPECIFIED WHETHER COMPLICATED, UNSPECIFIED WHETHER PERSISTENT: ICD-10-CM

## 2021-09-23 DIAGNOSIS — G54.2 CERVICAL NEUROPATHY: ICD-10-CM

## 2021-09-23 DIAGNOSIS — M50.30 DDD (DEGENERATIVE DISC DISEASE), CERVICAL: ICD-10-CM

## 2021-09-23 DIAGNOSIS — F41.9 ANXIETY: ICD-10-CM

## 2021-09-23 DIAGNOSIS — M25.512 ACUTE PAIN OF LEFT SHOULDER: ICD-10-CM

## 2021-09-23 DIAGNOSIS — R07.89 ATYPICAL CHEST PAIN: Primary | ICD-10-CM

## 2021-09-23 PROCEDURE — 99213 OFFICE O/P EST LOW 20 MIN: CPT | Mod: 95,,, | Performed by: FAMILY MEDICINE

## 2021-09-23 PROCEDURE — 99213 PR OFFICE/OUTPT VISIT, EST, LEVL III, 20-29 MIN: ICD-10-PCS | Mod: 95,,, | Performed by: FAMILY MEDICINE

## 2021-09-23 RX ORDER — METHOCARBAMOL 500 MG/1
500 TABLET, FILM COATED ORAL 4 TIMES DAILY
Qty: 40 TABLET | Refills: 0 | Status: SHIPPED | OUTPATIENT
Start: 2021-09-23 | End: 2021-10-03

## 2021-09-23 RX ORDER — IBUPROFEN 600 MG/1
600 TABLET ORAL 3 TIMES DAILY
Qty: 100 TABLET | Refills: 5 | Status: SHIPPED | OUTPATIENT
Start: 2021-09-23 | End: 2022-03-16 | Stop reason: SDUPTHER

## 2021-10-08 ENCOUNTER — PATIENT MESSAGE (OUTPATIENT)
Dept: PRIMARY CARE CLINIC | Facility: CLINIC | Age: 31
End: 2021-10-08

## 2021-10-14 ENCOUNTER — PATIENT MESSAGE (OUTPATIENT)
Dept: PRIMARY CARE CLINIC | Facility: CLINIC | Age: 31
End: 2021-10-14
Payer: MEDICAID

## 2021-10-18 ENCOUNTER — TELEPHONE (OUTPATIENT)
Dept: PRIMARY CARE CLINIC | Facility: CLINIC | Age: 31
End: 2021-10-18

## 2021-10-18 ENCOUNTER — TELEPHONE (OUTPATIENT)
Dept: NEUROSURGERY | Facility: CLINIC | Age: 31
End: 2021-10-18

## 2021-10-18 DIAGNOSIS — M50.30 DDD (DEGENERATIVE DISC DISEASE), CERVICAL: Primary | ICD-10-CM

## 2021-10-18 DIAGNOSIS — G89.29 CHRONIC LEFT SHOULDER PAIN: ICD-10-CM

## 2021-10-18 DIAGNOSIS — M25.512 CHRONIC LEFT SHOULDER PAIN: ICD-10-CM

## 2021-10-25 ENCOUNTER — OFFICE VISIT (OUTPATIENT)
Dept: NEUROSURGERY | Facility: CLINIC | Age: 31
End: 2021-10-25
Payer: MEDICAID

## 2021-10-25 VITALS — SYSTOLIC BLOOD PRESSURE: 134 MMHG | DIASTOLIC BLOOD PRESSURE: 73 MMHG | HEART RATE: 60 BPM

## 2021-10-25 DIAGNOSIS — M50.30 DDD (DEGENERATIVE DISC DISEASE), CERVICAL: Primary | ICD-10-CM

## 2021-10-25 PROCEDURE — 99999 PR PBB SHADOW E&M-EST. PATIENT-LVL III: CPT | Mod: PBBFAC,,, | Performed by: STUDENT IN AN ORGANIZED HEALTH CARE EDUCATION/TRAINING PROGRAM

## 2021-10-25 PROCEDURE — 99203 OFFICE O/P NEW LOW 30 MIN: CPT | Mod: S$PBB,,, | Performed by: STUDENT IN AN ORGANIZED HEALTH CARE EDUCATION/TRAINING PROGRAM

## 2021-10-25 PROCEDURE — 99203 PR OFFICE/OUTPT VISIT, NEW, LEVL III, 30-44 MIN: ICD-10-PCS | Mod: S$PBB,,, | Performed by: STUDENT IN AN ORGANIZED HEALTH CARE EDUCATION/TRAINING PROGRAM

## 2021-10-25 PROCEDURE — 99213 OFFICE O/P EST LOW 20 MIN: CPT | Mod: PBBFAC | Performed by: STUDENT IN AN ORGANIZED HEALTH CARE EDUCATION/TRAINING PROGRAM

## 2021-10-25 PROCEDURE — 99999 PR PBB SHADOW E&M-EST. PATIENT-LVL III: ICD-10-PCS | Mod: PBBFAC,,, | Performed by: STUDENT IN AN ORGANIZED HEALTH CARE EDUCATION/TRAINING PROGRAM

## 2021-10-26 ENCOUNTER — PATIENT MESSAGE (OUTPATIENT)
Dept: NEUROSURGERY | Facility: CLINIC | Age: 31
End: 2021-10-26
Payer: MEDICAID

## 2021-10-27 ENCOUNTER — TELEPHONE (OUTPATIENT)
Dept: NEUROSURGERY | Facility: CLINIC | Age: 31
End: 2021-10-27
Payer: MEDICAID

## 2021-11-04 ENCOUNTER — TELEPHONE (OUTPATIENT)
Dept: NEUROSURGERY | Facility: CLINIC | Age: 31
End: 2021-11-04
Payer: MEDICAID

## 2021-11-16 ENCOUNTER — TELEPHONE (OUTPATIENT)
Dept: NEUROSURGERY | Facility: CLINIC | Age: 31
End: 2021-11-16
Payer: MEDICAID

## 2021-11-16 ENCOUNTER — PATIENT MESSAGE (OUTPATIENT)
Dept: NEUROSURGERY | Facility: CLINIC | Age: 31
End: 2021-11-16
Payer: MEDICAID

## 2021-12-27 ENCOUNTER — TELEPHONE (OUTPATIENT)
Dept: NEUROSURGERY | Facility: CLINIC | Age: 31
End: 2021-12-27
Payer: MEDICAID

## 2021-12-29 ENCOUNTER — PATIENT OUTREACH (OUTPATIENT)
Dept: ADMINISTRATIVE | Facility: OTHER | Age: 31
End: 2021-12-29
Payer: MEDICAID

## 2022-02-07 ENCOUNTER — TELEPHONE (OUTPATIENT)
Dept: PRIMARY CARE CLINIC | Facility: CLINIC | Age: 32
End: 2022-02-07
Payer: MEDICAID

## 2022-02-07 NOTE — TELEPHONE ENCOUNTER
----- Message from Lucia Paulino sent at 2/7/2022  1:33 PM CST -----  Contact: Self/[phone]  Patient would like to get a referral.  Referral to what specialty:  Physical Therapy   Does the patient want the referral with a specific physician:  No   [facility]   Reason (be specific):  neck and shoulder  Does the patient already have the specialty clinic appointment scheduled:  No  If yes, what date is the appointment scheduled:     Is the insurance listed in Epic correct? (this is important for a referral):  yes  Advised patient that once provider approves this either a nurse or  will return their call?: yes  Would the patient like a call back, or a response through their [facility] portal?:  call back   Comments:   pt stated that she was told that since it had been 30 days since she last had PT due to pt having Covid and her work schedule

## 2022-03-16 ENCOUNTER — OFFICE VISIT (OUTPATIENT)
Dept: PRIMARY CARE CLINIC | Facility: CLINIC | Age: 32
End: 2022-03-16
Payer: MEDICAID

## 2022-03-16 ENCOUNTER — PATIENT MESSAGE (OUTPATIENT)
Dept: PRIMARY CARE CLINIC | Facility: CLINIC | Age: 32
End: 2022-03-16

## 2022-03-16 VITALS
HEIGHT: 60 IN | BODY MASS INDEX: 45.6 KG/M2 | RESPIRATION RATE: 18 BRPM | SYSTOLIC BLOOD PRESSURE: 100 MMHG | OXYGEN SATURATION: 98 % | DIASTOLIC BLOOD PRESSURE: 64 MMHG | HEART RATE: 66 BPM | WEIGHT: 232.25 LBS

## 2022-03-16 DIAGNOSIS — M50.30 DDD (DEGENERATIVE DISC DISEASE), CERVICAL: ICD-10-CM

## 2022-03-16 DIAGNOSIS — S16.1XXD STRAIN OF NECK MUSCLE, SUBSEQUENT ENCOUNTER: Primary | ICD-10-CM

## 2022-03-16 DIAGNOSIS — S29.019D THORACIC MYOFASCIAL STRAIN, SUBSEQUENT ENCOUNTER: ICD-10-CM

## 2022-03-16 DIAGNOSIS — E66.01 MORBID OBESITY WITH BMI OF 45.0-49.9, ADULT: ICD-10-CM

## 2022-03-16 DIAGNOSIS — R00.2 PALPITATIONS: ICD-10-CM

## 2022-03-16 DIAGNOSIS — J45.20 MILD INTERMITTENT ASTHMA WITHOUT COMPLICATION: ICD-10-CM

## 2022-03-16 DIAGNOSIS — M62.9 MUSCULOSKELETAL DISORDER INVOLVING UPPER TRAPEZIUS MUSCLE: ICD-10-CM

## 2022-03-16 DIAGNOSIS — R22.2 SUPRACLAVICULAR MASS: ICD-10-CM

## 2022-03-16 PROBLEM — S16.1XXA CERVICAL STRAIN: Status: ACTIVE | Noted: 2022-03-16

## 2022-03-16 PROBLEM — E66.9 OBESITY: Status: RESOLVED | Noted: 2020-02-18 | Resolved: 2022-03-16

## 2022-03-16 PROBLEM — S29.019A THORACIC MYOFASCIAL STRAIN: Status: ACTIVE | Noted: 2022-03-16

## 2022-03-16 PROCEDURE — 99214 OFFICE O/P EST MOD 30 MIN: CPT | Mod: PBBFAC,PN | Performed by: FAMILY MEDICINE

## 2022-03-16 PROCEDURE — 99214 OFFICE O/P EST MOD 30 MIN: CPT | Mod: S$PBB,,, | Performed by: FAMILY MEDICINE

## 2022-03-16 PROCEDURE — 1159F PR MEDICATION LIST DOCUMENTED IN MEDICAL RECORD: ICD-10-PCS | Mod: CPTII,,, | Performed by: FAMILY MEDICINE

## 2022-03-16 PROCEDURE — 3074F PR MOST RECENT SYSTOLIC BLOOD PRESSURE < 130 MM HG: ICD-10-PCS | Mod: CPTII,,, | Performed by: FAMILY MEDICINE

## 2022-03-16 PROCEDURE — 3008F PR BODY MASS INDEX (BMI) DOCUMENTED: ICD-10-PCS | Mod: CPTII,,, | Performed by: FAMILY MEDICINE

## 2022-03-16 PROCEDURE — 99214 PR OFFICE/OUTPT VISIT, EST, LEVL IV, 30-39 MIN: ICD-10-PCS | Mod: S$PBB,,, | Performed by: FAMILY MEDICINE

## 2022-03-16 PROCEDURE — 3074F SYST BP LT 130 MM HG: CPT | Mod: CPTII,,, | Performed by: FAMILY MEDICINE

## 2022-03-16 PROCEDURE — 1159F MED LIST DOCD IN RCRD: CPT | Mod: CPTII,,, | Performed by: FAMILY MEDICINE

## 2022-03-16 PROCEDURE — 99999 PR PBB SHADOW E&M-EST. PATIENT-LVL IV: ICD-10-PCS | Mod: PBBFAC,,, | Performed by: FAMILY MEDICINE

## 2022-03-16 PROCEDURE — 3078F PR MOST RECENT DIASTOLIC BLOOD PRESSURE < 80 MM HG: ICD-10-PCS | Mod: CPTII,,, | Performed by: FAMILY MEDICINE

## 2022-03-16 PROCEDURE — 3008F BODY MASS INDEX DOCD: CPT | Mod: CPTII,,, | Performed by: FAMILY MEDICINE

## 2022-03-16 PROCEDURE — 99999 PR PBB SHADOW E&M-EST. PATIENT-LVL IV: CPT | Mod: PBBFAC,,, | Performed by: FAMILY MEDICINE

## 2022-03-16 PROCEDURE — 3078F DIAST BP <80 MM HG: CPT | Mod: CPTII,,, | Performed by: FAMILY MEDICINE

## 2022-03-16 RX ORDER — METHOCARBAMOL 500 MG/1
500 TABLET, FILM COATED ORAL 4 TIMES DAILY
Qty: 40 TABLET | Refills: 5 | Status: SHIPPED | OUTPATIENT
Start: 2022-03-16 | End: 2022-03-26

## 2022-03-16 RX ORDER — IBUPROFEN 600 MG/1
600 TABLET ORAL 3 TIMES DAILY
Qty: 100 TABLET | Refills: 5 | Status: SHIPPED | OUTPATIENT
Start: 2022-03-16 | End: 2022-09-20

## 2022-03-16 NOTE — PROGRESS NOTES
Subjective:       Patient ID: Rivka Moreau is a 32 y.o. female.    Chief Complaint: No chief complaint on file.    HPI:  32-year-old white female in for left neck and left shoulder pain--since July--gym injury--had barbell on left upper trapezius for got but the paired so feel that irritated the neck and shoulder area while doing exercise --referred to physical therapy but patient had COVID Severo so missed time to go back --told had to start process over again due to medicaid.  Physical therapy was helping a lot.  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      History of back problems since March 2021 feels due waer and tear at work --has large breast --helped with special bra       Office visit- Sept 2021 31-year-old white female--pinched nerve --anything-tight on left shoulder causes pain--even patient's bra--pain radiates to the back of the left arm--had left clavicle--and a left axillary.  X-ray cervical spine shows braulio vertebral aree disc height loss at C5-6-with some foraminal-encroachment on C6 pt wants MRI.  Patient is a  so has problems with the stiff neck intermittently--on feet all day--had to shortness services because certain services exacerbate patient's pain.        Office visit 08/22/2021 31-year-old [place]--planning of pinched nerve--since March--starts in the mid upper trapezius area anteriorly--radiates to triceps area to the elbow into the axillary area into the anterior chest to approximately the midsternal area 3rd through 4th rib---no trauma--no excessive activity--patient is a hairdresser--states pain got worse 1 working out at the gym--went to the chiropractor states seems to irritate the area.  Patient taking ibuprofen only when able  Tolerate it   Not comfortable sitting--not comfortable  laying down.  If lifts something feels a burning sensation.     ROS:      Skin: no psoriasis, eczema, skin cancer  HEENT:  No headaches , no  ocular pain, blurred vision, diplopia, epistaxis, hoarseness  No change in voice,no  thyroid trouble  Lung: No pneumonia,  Tb, wheezing, SOB, + asthma--no smoking--was wheezing past has inhaler  Heart: no chest pain no  ankle edema,occas palpitations with anxiety , no MI, nathan murmur, hypertension, hyperlipidemia gets occasional palpitations with menstruations and fatigue  Abdomen: No nausea, vomiting, diarrhea, constipation, ulcers, hepatitis, gallbladder disease, melena, hematochezia, hematemesis-patient states told had 2 hernias   : no UTI, renal disease, stones   Gyn LMP last week   MS: no fractures, O/A, lupus, rheumatoid, gout--history neck and shoulder pain July see history present illness back pain March see history present illness  Neuro: No dizziness, LOC, seizures   No diabetes, no anemia, + anxiety brother-addict opiod doing better these days  , no depression    , 2 children, work [employer] lives with 2 children and       Objective:   Physical Exam:   General: Well nourished, well developed, no acute distress + overweight   Skin: No lesions  HEENT: Eyes PERRLA, EOM intact, nose clear , throat  Non erythematous ears TM cl   NECK: Supple, no bruits, No JVD, + node ??left supraclavicular area --+ swelling   Lungs: Clear, no rales, rhonchi, wheezing   Heart: Regular rate and rhythm, no murmurs, gallops, or rubs  Abdomen: flat, bowel sounds positive, no tenderness, or organomegaly  MS: no significant change  Tenderness right shoulder pain with palpation upper trapezius from the occipital area to the acromioclavicular joint especially in the midportion of the upper trapezius--full range of motion muscle strength but painful in the upper trapezius area .  Pain in the left midscapular area with raising arm overhead or anterior posterior flexion  shoulders--probably due to work  with large breasts   Neuro: Alert, CN intact, oriented X 3 Romberg negative heel-toe intact  Extremities: No cyanosis, clubbing, or edema         Assessment:       1. Strain of neck muscle, subsequent encounter    2. Musculoskeletal disorder involving upper trapezius muscle    3. Thoracic myofascial strain, subsequent encounter    4. Morbid obesity with BMI of 45.0-49.9, adult    5. Palpitations    6. Mild intermittent asthma without complication    7. DDD (degenerative disc disease), cervical    8. Supraclavicular mass        Plan:       Strain of neck muscle, subsequent encounter  -     Ambulatory referral/consult to Physical/Occupational Therapy; Future; Expected date: 03/23/2022    Musculoskeletal disorder involving upper trapezius muscle  -     Ambulatory referral/consult to Physical/Occupational Therapy; Future; Expected date: 03/23/2022    Thoracic myofascial strain, subsequent encounter  -     Ambulatory referral/consult to Physical/Occupational Therapy; Future; Expected date: 03/23/2022    Morbid obesity with BMI of 45.0-49.9, adult    Palpitations  -     CBC Auto Differential; Future; Expected date: 03/16/2022  -     Comprehensive Metabolic Panel; Future; Expected date: 03/16/2022  -     Lipid Panel; Future; Expected date: 03/16/2022  -     X-Ray Chest PA And Lateral; Future; Expected date: 03/16/2022  -     T4, Free; Future; Expected date: 03/16/2022  -     TSH; Future; Expected date: 03/16/2022    Mild intermittent asthma without complication    DDD (degenerative disc disease), cervical    Supraclavicular mass  -     CT Soft Tissue Neck WO Contrast; Future; Expected date: 03/16/2022    Other orders  -     ibuprofen (ADVIL,MOTRIN) 600 MG tablet; Take 1 tablet (600 mg total) by mouth 3 (three) times daily.  Dispense: 100 tablet; Refill: 5  -     methocarbamoL (ROBAXIN) 500 MG Tab; Take 1 tablet (500 mg total) by mouth 4 (four) times daily. for 10 days  Dispense:  40 tablet; Refill: 5       Main Reason here   Neck and Left shoulder pain --pain with anterior posterior flexion of the neck--especially with lateral flexion rotation of the neck to the right with spasm of the left upper trapezius muscle---good hand grasp good opposition thumb index thumb 5th digit good flexion extension to opposition before--good abduction abduction of the upper arm--pain with anterior posterior flexion the shoulders in the left midthoracic area appears to be area of the back pain---Moist heat/theragesic or Tiger balm/range of motion exercise--continue ibuprofen 600 q.6 hours and Robaxin 500 1 p.o. q.h.s.--pt did not want pain meds --states did well with physical therapy needs at reordered--return in 3 months if not better will consider MRI of the neck  History of lump in the left supraclavicular area--CT scan of the neck with attention to the left supraclavicular area and chest Xray  History of asthma/anxiety brother with history of opioid probable  Morbid obesity needs to decrease weight  Hx palpitation around time period or if fatigued  History asthma doing well  Lab CBCs CMP lipids T4 TSH  Health maintenance pneumococcal vaccine flu vaccine COVID booster  Return 6 weeks

## 2022-03-26 ENCOUNTER — PATIENT MESSAGE (OUTPATIENT)
Dept: PRIMARY CARE CLINIC | Facility: CLINIC | Age: 32
End: 2022-03-26
Payer: MEDICAID

## 2022-03-28 DIAGNOSIS — J01.00 SUBACUTE MAXILLARY SINUSITIS: Primary | ICD-10-CM

## 2022-05-03 ENCOUNTER — PATIENT MESSAGE (OUTPATIENT)
Dept: PRIMARY CARE CLINIC | Facility: CLINIC | Age: 32
End: 2022-05-03
Payer: MEDICAID

## 2022-06-10 ENCOUNTER — TELEPHONE (OUTPATIENT)
Dept: OTOLARYNGOLOGY | Facility: CLINIC | Age: 32
End: 2022-06-10
Payer: MEDICAID

## 2022-06-20 ENCOUNTER — OFFICE VISIT (OUTPATIENT)
Dept: PRIMARY CARE CLINIC | Facility: CLINIC | Age: 32
End: 2022-06-20
Payer: MEDICAID

## 2022-06-20 VITALS
WEIGHT: 232.25 LBS | HEART RATE: 60 BPM | SYSTOLIC BLOOD PRESSURE: 108 MMHG | RESPIRATION RATE: 18 BRPM | BODY MASS INDEX: 45.6 KG/M2 | HEIGHT: 60 IN | OXYGEN SATURATION: 100 % | DIASTOLIC BLOOD PRESSURE: 72 MMHG

## 2022-06-20 DIAGNOSIS — G54.2 CERVICAL NEUROPATHY: ICD-10-CM

## 2022-06-20 DIAGNOSIS — S29.019D THORACIC MYOFASCIAL STRAIN, SUBSEQUENT ENCOUNTER: Primary | ICD-10-CM

## 2022-06-20 DIAGNOSIS — E66.01 MORBID OBESITY WITH BMI OF 45.0-49.9, ADULT: ICD-10-CM

## 2022-06-20 DIAGNOSIS — S16.1XXD STRAIN OF NECK MUSCLE, SUBSEQUENT ENCOUNTER: ICD-10-CM

## 2022-06-20 DIAGNOSIS — M25.512 ACUTE PAIN OF LEFT SHOULDER: ICD-10-CM

## 2022-06-20 DIAGNOSIS — F41.9 ANXIETY: ICD-10-CM

## 2022-06-20 DIAGNOSIS — M50.30 DDD (DEGENERATIVE DISC DISEASE), CERVICAL: ICD-10-CM

## 2022-06-20 DIAGNOSIS — J45.20 MILD INTERMITTENT ASTHMA WITHOUT COMPLICATION: ICD-10-CM

## 2022-06-20 DIAGNOSIS — K21.9 GASTROESOPHAGEAL REFLUX DISEASE WITHOUT ESOPHAGITIS: ICD-10-CM

## 2022-06-20 PROBLEM — I95.9 HYPOTENSION: Status: RESOLVED | Noted: 2018-02-21 | Resolved: 2022-06-20

## 2022-06-20 PROCEDURE — 1159F PR MEDICATION LIST DOCUMENTED IN MEDICAL RECORD: ICD-10-PCS | Mod: CPTII,,, | Performed by: FAMILY MEDICINE

## 2022-06-20 PROCEDURE — 3078F PR MOST RECENT DIASTOLIC BLOOD PRESSURE < 80 MM HG: ICD-10-PCS | Mod: CPTII,,, | Performed by: FAMILY MEDICINE

## 2022-06-20 PROCEDURE — 3074F SYST BP LT 130 MM HG: CPT | Mod: CPTII,,, | Performed by: FAMILY MEDICINE

## 2022-06-20 PROCEDURE — 99999 PR PBB SHADOW E&M-EST. PATIENT-LVL III: ICD-10-PCS | Mod: PBBFAC,,, | Performed by: FAMILY MEDICINE

## 2022-06-20 PROCEDURE — 3078F DIAST BP <80 MM HG: CPT | Mod: CPTII,,, | Performed by: FAMILY MEDICINE

## 2022-06-20 PROCEDURE — 3008F BODY MASS INDEX DOCD: CPT | Mod: CPTII,,, | Performed by: FAMILY MEDICINE

## 2022-06-20 PROCEDURE — 99214 PR OFFICE/OUTPT VISIT, EST, LEVL IV, 30-39 MIN: ICD-10-PCS | Mod: S$PBB,,, | Performed by: FAMILY MEDICINE

## 2022-06-20 PROCEDURE — 99999 PR PBB SHADOW E&M-EST. PATIENT-LVL III: CPT | Mod: PBBFAC,,, | Performed by: FAMILY MEDICINE

## 2022-06-20 PROCEDURE — 3008F PR BODY MASS INDEX (BMI) DOCUMENTED: ICD-10-PCS | Mod: CPTII,,, | Performed by: FAMILY MEDICINE

## 2022-06-20 PROCEDURE — 99213 OFFICE O/P EST LOW 20 MIN: CPT | Mod: PBBFAC,PN | Performed by: FAMILY MEDICINE

## 2022-06-20 PROCEDURE — 3074F PR MOST RECENT SYSTOLIC BLOOD PRESSURE < 130 MM HG: ICD-10-PCS | Mod: CPTII,,, | Performed by: FAMILY MEDICINE

## 2022-06-20 PROCEDURE — 99214 OFFICE O/P EST MOD 30 MIN: CPT | Mod: S$PBB,,, | Performed by: FAMILY MEDICINE

## 2022-06-20 PROCEDURE — 1159F MED LIST DOCD IN RCRD: CPT | Mod: CPTII,,, | Performed by: FAMILY MEDICINE

## 2022-06-20 RX ORDER — METHOCARBAMOL 750 MG/1
TABLET, FILM COATED ORAL
Qty: 30 TABLET | Refills: 5 | Status: SHIPPED | OUTPATIENT
Start: 2022-06-20 | End: 2022-09-20

## 2022-06-20 RX ORDER — OMEPRAZOLE 40 MG/1
40 CAPSULE, DELAYED RELEASE ORAL DAILY
Qty: 30 CAPSULE | Refills: 5 | Status: SHIPPED | OUTPATIENT
Start: 2022-06-20 | End: 2022-09-20

## 2022-06-20 RX ORDER — MELOXICAM 7.5 MG/1
TABLET ORAL
Qty: 60 TABLET | Refills: 5 | Status: SHIPPED | OUTPATIENT
Start: 2022-06-20 | End: 2022-09-20

## 2022-06-20 NOTE — PATIENT INSTRUCTIONS
Cervical strain-left upper trapezius strain--DDD cervical spine--cervical radiculopathy--Moist heat/theragesic/range of motion exercise--physical therapy patient has used in the past and was helpful--seen by neurologist--had Robaxin 750 1 p.o. q.h.s. for muscle spasm   Patient afraid to pain medications gabapentin Lyrica due to brother with opioid problem   History gastritis/GERD--exercise try to get ideal body weight--avoid fatty foods--omeprazole 40 mg 1 p.o. q.d.--avoid caffeine smoking alcohol NSAIDs carbonated drinks stress   CT scan--neck showed mucoid retention cyst of the maxillary sinus patient has appoint with ENT   Appeals improved may need to see orthopedist   History of asthma

## 2022-06-20 NOTE — PROGRESS NOTES
Subjective:       Patient ID: Rivka Moreau is a 32 y.o. female.    Chief Complaint: Digestive Issues, New Physical Therapy Referral ([facility]), and Results    HPI:  32-year-old white female in for digestive issues-new referral-results--      When eats potato chips--fried food--or grilled cheese--gets indigestion in sore in the midsternal area--started 1 month ago--gets that every time she eats wrong food--no nausea vomiting diarrhea--no constipation--no ulcers hepatitis gallbladder disease--no melena hematochezia hematemesis.  Takes Tums or eat yogurt.         Lab results--CBCs CMP lipids thyroid all appear to be within normal limits        [facility]--[facility]  poor as for shoulder and back--has not seen orthopedist is not seen neurosurgeon.  Patient saw neurologist told symptoms were not severe enough for physical therapy to help--but patient states symptoms have persisted.  Patient is a --physical therapy did help when she went--left shoulder hurts--usually at work shoes down to the left elbow into the left midscapular area.       Just got an exercise bike--and when exercises gets pain in the left upper chest area--pectoralis major--possibly from holding arms up.         ROS:      Skin: no psoriasis, eczema, skin cancer  HEENT:  No headaches , no  ocular pain, blurred vision, diplopia, epistaxis, hoarseness  No change in voice,no  thyroid trouble  Lung: No pneumonia,  Tb, wheezing, SOB, + asthma--no smoking--was wheezing past has inhaler  Heart: no chest pain no  ankle edema,occas palpitations with anxiety , no MI, nathan murmur, hypertension, hyperlipidemia gets occasional palpitations with menstruations and fatigue  Abdomen:  History gastritis?  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       Objective:   Physical Exam:   General: Well nourished, well developed, no acute distress + overweight   Skin: No lesions  HEENT: Eyes PERRLA, EOM intact, nose clear , throat  Non erythematous ears TM cl   NECK: Supple, no bruits, No JVD, + node ??left supraclavicular area --+ swelling   Lungs: Clear, no rales, rhonchi, wheezing   Heart: Regular rate and rhythm, no murmurs, gallops, or rubs  Abdomen: flat, bowel sounds positive, no tenderness, or organomegaly  MS: no significant change  Tenderness right shoulder pain with palpation upper trapezius from the occipital area to the acromioclavicular joint especially in the midportion of the upper trapezius--full range of motion muscle strength but painful in the upper trapezius area .  Pain in the left midscapular area with raising arm overhead or anterior posterior flexion shoulders--probably due to work  with large breasts   Neuro: Alert, CN intact, oriented X 3 Romberg negative heel-toe intact  Extremities: No cyanosis, clubbing, or edema         Assessment:       1. Thoracic myofascial strain, subsequent encounter    2. Strain of neck muscle, subsequent encounter    3. DDD (degenerative disc disease), cervical    4. Cervical neuropathy    5. Anxiety    6. Mild intermittent asthma without complication    7. Morbid obesity with BMI of 45.0-49.9, adult    8. Acute pain of left shoulder    9. Gastroesophageal reflux disease without esophagitis        Plan:       Thoracic myofascial strain, subsequent encounter  -     Ambulatory  referral/consult to Physical/Occupational Therapy; Future; Expected date: 06/27/2022  -     MRI Cervical Spine Without Contrast; Future; Expected date: 06/20/2022    Strain of neck muscle, subsequent encounter  -     Ambulatory referral/consult to Physical/Occupational Therapy; Future; Expected date: 06/27/2022  -     MRI Cervical Spine Without Contrast; Future; Expected date: 06/20/2022    DDD (degenerative disc disease), cervical  -     MRI Cervical Spine Without Contrast; Future; Expected date: 06/20/2022    Cervical neuropathy  -     MRI Cervical Spine Without Contrast; Future; Expected date: 06/20/2022    Anxiety    Mild intermittent asthma without complication    Morbid obesity with BMI of 45.0-49.9, adult    Acute pain of left shoulder    Gastroesophageal reflux disease without esophagitis    Other orders  -     omeprazole (PRILOSEC) 40 MG capsule; Take 1 capsule (40 mg total) by mouth once daily.  Dispense: 30 capsule; Refill: 5  -     meloxicam (MOBIC) 7.5 MG tablet; One p.o. b.i.d. for neck pain no other NSAIDs can take with Tylenol  Dispense: 60 tablet; Refill: 5  -     methocarbamoL (ROBAXIN) 750 MG Tab; One p.o. q.h.s. p.r.n. muscle spasm will help sleep  Dispense: 30 tablet; Refill: 5       Main Reason here   Complaining of digestive issues--if eats fatty or fried food --grilled cheese sandwich--potato chips--gets gastritis--will start on omeprazole 40 mg--1 p.o. q.d. 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 and Robaxin 750 mg 1 p.o. q.h.s. for muscle spasm  Raise seat on exercise bike so not raising arms when exercising but having arms go down by her side to prevent problems with pectoralis muscle  Keep appointment with ENT--saw neurologist may need to see orthopedist  Had lab done in March no new lab  Health maintenance tetanus COVID

## 2022-06-23 ENCOUNTER — OFFICE VISIT (OUTPATIENT)
Dept: OTOLARYNGOLOGY | Facility: CLINIC | Age: 32
End: 2022-06-23
Payer: MEDICAID

## 2022-06-23 VITALS — DIASTOLIC BLOOD PRESSURE: 74 MMHG | SYSTOLIC BLOOD PRESSURE: 102 MMHG | HEART RATE: 64 BPM

## 2022-06-23 DIAGNOSIS — H93.8X3 EAR FULLNESS, BILATERAL: ICD-10-CM

## 2022-06-23 DIAGNOSIS — J34.1 RETENTION CYST OF PARANASAL SINUS: Primary | ICD-10-CM

## 2022-06-23 PROCEDURE — 3074F SYST BP LT 130 MM HG: CPT | Mod: CPTII,,, | Performed by: NURSE PRACTITIONER

## 2022-06-23 PROCEDURE — 3078F DIAST BP <80 MM HG: CPT | Mod: CPTII,,, | Performed by: NURSE PRACTITIONER

## 2022-06-23 PROCEDURE — 99999 PR PBB SHADOW E&M-EST. PATIENT-LVL III: ICD-10-PCS | Mod: PBBFAC,,, | Performed by: NURSE PRACTITIONER

## 2022-06-23 PROCEDURE — 3074F PR MOST RECENT SYSTOLIC BLOOD PRESSURE < 130 MM HG: ICD-10-PCS | Mod: CPTII,,, | Performed by: NURSE PRACTITIONER

## 2022-06-23 PROCEDURE — 3078F PR MOST RECENT DIASTOLIC BLOOD PRESSURE < 80 MM HG: ICD-10-PCS | Mod: CPTII,,, | Performed by: NURSE PRACTITIONER

## 2022-06-23 PROCEDURE — 1159F PR MEDICATION LIST DOCUMENTED IN MEDICAL RECORD: ICD-10-PCS | Mod: CPTII,,, | Performed by: NURSE PRACTITIONER

## 2022-06-23 PROCEDURE — 1159F MED LIST DOCD IN RCRD: CPT | Mod: CPTII,,, | Performed by: NURSE PRACTITIONER

## 2022-06-23 PROCEDURE — 99213 OFFICE O/P EST LOW 20 MIN: CPT | Mod: PBBFAC | Performed by: NURSE PRACTITIONER

## 2022-06-23 PROCEDURE — 99203 PR OFFICE/OUTPT VISIT, NEW, LEVL III, 30-44 MIN: ICD-10-PCS | Mod: S$PBB,,, | Performed by: NURSE PRACTITIONER

## 2022-06-23 PROCEDURE — 99203 OFFICE O/P NEW LOW 30 MIN: CPT | Mod: S$PBB,,, | Performed by: NURSE PRACTITIONER

## 2022-06-23 PROCEDURE — 99999 PR PBB SHADOW E&M-EST. PATIENT-LVL III: CPT | Mod: PBBFAC,,, | Performed by: NURSE PRACTITIONER

## 2022-06-23 RX ORDER — FLUTICASONE PROPIONATE 50 MCG
1 SPRAY, SUSPENSION (ML) NASAL DAILY
Qty: 16 G | Refills: 2 | Status: SHIPPED | OUTPATIENT
Start: 2022-06-23 | End: 2022-09-20

## 2022-06-23 NOTE — PATIENT INSTRUCTIONS
Nasal Steroid Spray    You have been prescribed or instructed to take a nasal steroid spray. Examples of this medication include Flonase (fluticasone), Nasacort (triamcinolone), and Rhinocort (budesonide). Some symptoms will experience relief within a few days; however, it may take 2-3 weeks to begin to see improvement. This medication needs to be taken consistently to see results.    Use as directed and please be aware the Flonase takes 7-10 days of consistent use before becoming effective- so try to be patient :)    Helpful hints for maximizing medication into the nose  - Use the opposite hand to spray the nostril (example: right hand for left nostril). This will help avoid spraying the medication onto the septum (the area that divides the left and right nasal cavity.)  - Tilt the bottle so that it is facing at a slight angle up or straight back, but avoid pointing the bottle straight up while spraying.   - Gently sniff (do not snort) a few seconds after spraying.

## 2022-06-23 NOTE — PROGRESS NOTES
Subjective:      Rivka Moreau is a 32 y.o. female who was referred to me by Dr. Delta Reyes in consultation for multiple ENT concerns.    Mrs. Moreau was initially referred for an incidental finding on CT.  In March she obtained imaging for left neck fullness/ a possible mass.  Her imaging was clear, however a right maxillary sinus rentention cyst was noted.  She denies any purulent drainage, rhinorrhea, hyposmia, maxillary or frontal pressure/pain or nasal congestion.  She does reports some sinus pressure to the bridge of her nose, that clears up with a shower, cup of tea or just gradually throughout the day.  She also reports tinnitus and intermittent ear pain bilaterally (L>R) that has been present for about 5 years.  She does not take any thing regularly for this problem.  She believes her hearing may be better on the right and there is also tinnitus present.      She does not regularly use nasal decongestant sprays.  She denies a history of recurrent sinus infections.  She does not recall previously having allergy testing.  She relates a history of asthma which is not currently managed with medication.  Uses an inhaler PRN (uses rarely, when flying).  She relates a history of reflux symptoms, she was recently diagnosed and was prescribed omeprazole which she has not started.  She has not previously had an EGD.  She denies have a diagnosis of obstructive sleep apnea.   She has not had sinonasal surgery.  She does not recall a prior history of nasal trauma.    She has a past medical history of Allergy, Anxiety, and Shingles rash.    Past Surgical History  She has a past surgical history that includes Eye surgery and Oophorectomy (Left).    Family History  Her family history is not on file.    Social History  She reports that she has never smoked. She has never used smokeless tobacco. She reports that she does not drink alcohol and does not use drugs.    Allergies  She has No Known  Allergies.    Medications   She has a current medication list which includes the following prescription(s): ibuprofen, fluticasone propionate, meloxicam, methocarbamol, and omeprazole.  Review of Systems     Constitutional: Negative for appetite change, chills, fatigue, fever and unexpected weight loss.      HENT: Positive for ear infection, ear pain, postnasal drip, sinus pressure, sore throat, trouble swallowing and voice change.      Eyes:  Positive for eye drainage and eye itching.     Respiratory:  Positive for wheezing.      Cardiovascular:  Positive for irregular heartbeat.     Gastrointestinal:  Positive for acid reflux and heartburn.     Genitourinary: Negative for difficulty urinating, sexual problems and frequent urination.     Musc: Positive for aching muscles, back pain and neck pain.     Skin: Negative for rash.     Allergy: Negative for food allergies and seasonal allergies.     Endocrine: Negative for cold intolerance and heat intolerance.      Neurological: Positive for dizziness and light-headedness.     Hematologic: Negative for bruises/bleeds easily and swollen glands.      Psychiatric: Negative for decreased concentration, depression, nervous/anxious and sleep disturbance.          Objective:   /74   Pulse 64   LMP  (LMP Unknown)      Constitutional:   She is oriented to person, place, and time. She appears well-developed and well-nourished. She appears alert. She is cooperative.  Non-toxic appearance. She does not have a sickly appearance. She does not appear ill. No distress. Normal speech.      Head:  Normocephalic and atraumatic. Not macrocephalic and not microcephalic. Head is without right periorbital erythema, without left periorbital erythema and without TMJ tenderness. Salivary glands normal.  Facial strength is normal.      Ears:    Right Ear: No lacerations. No drainage, swelling or tenderness. No mastoid tenderness. Tympanic membrane is not injected, not scarred, not  perforated, not erythematous, not retracted and not bulging. No middle ear effusion.   Left Ear: No lacerations. No drainage, swelling or tenderness. No mastoid tenderness. Tympanic membrane is not injected, not scarred, not perforated, not erythematous, not retracted and not bulging.  No middle ear effusion.     Nose:  Rhinorrhea and septal deviation (slight) present. No mucosal edema or sinus tenderness. No epistaxis. Right sinus exhibits no maxillary sinus tenderness and no frontal sinus tenderness. Left sinus exhibits no maxillary sinus tenderness and no frontal sinus tenderness.     Mouth/Throat  Oropharynx not clear and moist and abnormal uvula midline. Normal dentition. No uvula swelling. No oropharyngeal exudate. Tonsils present, +1.      Neck:  Trachea normal, phonation normal and no adenopathy.     Pulmonary/Chest:   Effort normal.     Psychiatric:   She has a normal mood and affect. Her speech is normal and behavior is normal.     Neurological:   She is alert and oriented to person, place, and time. She has neurological normal, alert and oriented.     Procedure    None    Data Reviewed  WBC (K/uL)   Date Value   03/16/2022 7.01     Eosinophil % (%)   Date Value   03/16/2022 3.3     Eos # (K/uL)   Date Value   03/16/2022 0.2     Platelets (K/uL)   Date Value   03/16/2022 270     Glucose (mg/dL)   Date Value   03/16/2022 90     No results found for: IGE    Imaging  IMAGING: I independently reviewed the CT soft tissue neck dated 3/24/2022.  Pertinent findings: The paranasal sinuses demonstrate minimal mucosal thickening of one anterior right ethmoid air cell.  There is a small mucous retention cyst at the floor of the right maxillary sinus.    Assessment:     1. Retention cyst of paranasal sinus    2. Ear fullness, bilateral      Plan:     Retention cyst of paranasal sinus        -     Incidental finding without concerning symptoms for sinus disease.  Denies maxillary or tooth pain.  Denies recurrent sinus  infections.         -     Monitor for worsening symptoms.  Nasal endoscopy if needed.     Ear fullness, bilateral        -     Otoscopic exam benign.  Discussed the anatomy and function of the eustachian tube including aerating and draining the middle ear.  Common symptoms include: muffled/reduced hearing, plugged feeling, ear clicking/ popping, tinnitus or ear pain.          -     Flonase daily 2 SEN        -     Gentle auto-insufflation 1-2 times daily  -     fluticasone propionate (FLONASE) 50 mcg/actuation nasal spray; 1 spray (50 mcg total) by Each Nostril route once daily.    Follow up if symptoms worsen or fail to improve.     RTC in one month for audiogram if tinnitus and hearing loss do not improve.

## 2022-07-05 ENCOUNTER — PATIENT MESSAGE (OUTPATIENT)
Dept: PRIMARY CARE CLINIC | Facility: CLINIC | Age: 32
End: 2022-07-05
Payer: MEDICAID

## 2022-08-04 ENCOUNTER — PATIENT MESSAGE (OUTPATIENT)
Dept: PRIMARY CARE CLINIC | Facility: CLINIC | Age: 32
End: 2022-08-04
Payer: MEDICAID

## 2022-09-20 ENCOUNTER — OFFICE VISIT (OUTPATIENT)
Dept: PRIMARY CARE CLINIC | Facility: CLINIC | Age: 32
End: 2022-09-20
Payer: MEDICAID

## 2022-09-20 VITALS
HEIGHT: 60 IN | HEART RATE: 61 BPM | OXYGEN SATURATION: 99 % | SYSTOLIC BLOOD PRESSURE: 100 MMHG | BODY MASS INDEX: 45.4 KG/M2 | DIASTOLIC BLOOD PRESSURE: 72 MMHG | TEMPERATURE: 98 F | WEIGHT: 231.25 LBS | RESPIRATION RATE: 18 BRPM

## 2022-09-20 DIAGNOSIS — S16.1XXD STRAIN OF NECK MUSCLE, SUBSEQUENT ENCOUNTER: ICD-10-CM

## 2022-09-20 DIAGNOSIS — S29.019D THORACIC MYOFASCIAL STRAIN, SUBSEQUENT ENCOUNTER: ICD-10-CM

## 2022-09-20 DIAGNOSIS — E66.01 MORBID OBESITY WITH BMI OF 45.0-49.9, ADULT: ICD-10-CM

## 2022-09-20 DIAGNOSIS — M50.30 DDD (DEGENERATIVE DISC DISEASE), CERVICAL: ICD-10-CM

## 2022-09-20 DIAGNOSIS — F41.9 ANXIETY: ICD-10-CM

## 2022-09-20 DIAGNOSIS — M62.9 MUSCULOSKELETAL DISORDER INVOLVING UPPER TRAPEZIUS MUSCLE: ICD-10-CM

## 2022-09-20 DIAGNOSIS — M25.512 LEFT SHOULDER PAIN, UNSPECIFIED CHRONICITY: Primary | ICD-10-CM

## 2022-09-20 DIAGNOSIS — K21.9 GASTROESOPHAGEAL REFLUX DISEASE WITHOUT ESOPHAGITIS: ICD-10-CM

## 2022-09-20 PROCEDURE — 99999 PR PBB SHADOW E&M-EST. PATIENT-LVL IV: CPT | Mod: PBBFAC,,, | Performed by: FAMILY MEDICINE

## 2022-09-20 PROCEDURE — 1159F PR MEDICATION LIST DOCUMENTED IN MEDICAL RECORD: ICD-10-PCS | Mod: CPTII,,, | Performed by: FAMILY MEDICINE

## 2022-09-20 PROCEDURE — 99214 OFFICE O/P EST MOD 30 MIN: CPT | Mod: S$PBB,,, | Performed by: FAMILY MEDICINE

## 2022-09-20 PROCEDURE — 99214 OFFICE O/P EST MOD 30 MIN: CPT | Mod: PBBFAC,PN | Performed by: FAMILY MEDICINE

## 2022-09-20 PROCEDURE — 3074F PR MOST RECENT SYSTOLIC BLOOD PRESSURE < 130 MM HG: ICD-10-PCS | Mod: CPTII,,, | Performed by: FAMILY MEDICINE

## 2022-09-20 PROCEDURE — 3008F BODY MASS INDEX DOCD: CPT | Mod: CPTII,,, | Performed by: FAMILY MEDICINE

## 2022-09-20 PROCEDURE — 3078F DIAST BP <80 MM HG: CPT | Mod: CPTII,,, | Performed by: FAMILY MEDICINE

## 2022-09-20 PROCEDURE — 99999 PR PBB SHADOW E&M-EST. PATIENT-LVL IV: ICD-10-PCS | Mod: PBBFAC,,, | Performed by: FAMILY MEDICINE

## 2022-09-20 PROCEDURE — 3008F PR BODY MASS INDEX (BMI) DOCUMENTED: ICD-10-PCS | Mod: CPTII,,, | Performed by: FAMILY MEDICINE

## 2022-09-20 PROCEDURE — 3078F PR MOST RECENT DIASTOLIC BLOOD PRESSURE < 80 MM HG: ICD-10-PCS | Mod: CPTII,,, | Performed by: FAMILY MEDICINE

## 2022-09-20 PROCEDURE — 99214 PR OFFICE/OUTPT VISIT, EST, LEVL IV, 30-39 MIN: ICD-10-PCS | Mod: S$PBB,,, | Performed by: FAMILY MEDICINE

## 2022-09-20 PROCEDURE — 1159F MED LIST DOCD IN RCRD: CPT | Mod: CPTII,,, | Performed by: FAMILY MEDICINE

## 2022-09-20 PROCEDURE — 3074F SYST BP LT 130 MM HG: CPT | Mod: CPTII,,, | Performed by: FAMILY MEDICINE

## 2022-09-20 RX ORDER — CYCLOBENZAPRINE HCL 10 MG
TABLET ORAL
Qty: 30 TABLET | Refills: 5 | Status: SHIPPED | OUTPATIENT
Start: 2022-09-20 | End: 2022-10-04

## 2022-09-20 NOTE — PROGRESS NOTES
Subjective:       Patient ID: Rivka Moreau is a 32 y.o. female.    Chief Complaint: Follow-up    HPI:  32-year-old white female--MRI follow-up--patient had C5-6 with bulging discs C6-7--neck pain --pain mainly in the C7 area and left upper tripped area with some pain down to the infra scapular area on the left. Shoulder pain -- pt curious if breast reduction would help--patient is a  stands up at work all day in leans over.  Does have some pain with lateral flexion rotation neck side to side up and--if raises arms overhead occasionally hears a crepitus or popping sensation in the left shoulder.  Does have pain with raising left arm overhead.      ROS:   No significant change except for history of present illness   Skin: no psoriasis, eczema, skin cancer  HEENT:  No headaches , no  ocular pain, blurred vision, diplopia, epistaxis, hoarseness  No change in voice,no  thyroid trouble  Lung: No pneumonia,  Tb, wheezing, SOB, + asthma--no smoking--was wheezing past has inhaler  Heart: no chest pain no  ankle edema,occas palpitations with anxiety , no MI, nathan murmur, hypertension, hyperlipidemia gets occasional palpitations with menstruations and fatigue  Abdomen:  History gastritis?  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 and       Objective:   Physical Exam:   General: Well nourished, well developed, no acute  distress + overweight   Skin: No lesions  HEENT: Eyes PERRLA, EOM intact, nose clear , throat  Non erythematous ears TM cl   NECK: Supple, no bruits, No JVD, + node ??left supraclavicular area --+ swelling   Lungs: Clear, no rales, rhonchi, wheezing   Heart: Regular rate and rhythm, no murmurs, gallops, or rubs  Abdomen: flat, bowel sounds positive, no tenderness, or organomegaly  MS: no significant change  Tenderness right shoulder pain with palpation upper trapezius from the occipital area to the acromioclavicular joint especially in the midportion of the upper trapezius--full range of motion muscle strength but painful in the upper trapezius area .  Pain in the left midscapular area with raising arm overhead or anterior posterior flexion shoulders--probably due to work  --patient feels may need breast reduction   Neuro: Alert, CN intact, oriented X 3 Romberg negative heel-toe intact  Extremities: No cyanosis, clubbing, or edema         Assessment:       1. Left shoulder pain, unspecified chronicity    2. Musculoskeletal disorder involving upper trapezius muscle    3. Strain of neck muscle, subsequent encounter    4. Thoracic myofascial strain, subsequent encounter    5. Gastroesophageal reflux disease without esophagitis    6. Morbid obesity with BMI of 45.0-49.9, adult    7. Anxiety    8. DDD (degenerative disc disease), cervical          Plan:       Left shoulder pain, unspecified chronicity  -     Ambulatory referral/consult to Orthopedics; Future; Expected date: 09/27/2022  -     Ambulatory referral/consult to Physical/Occupational Therapy; Future; Expected date: 09/27/2022    Musculoskeletal disorder involving upper trapezius muscle    Strain of neck muscle, subsequent encounter    Thoracic myofascial strain, subsequent encounter    Gastroesophageal reflux disease without esophagitis    Morbid obesity with BMI of 45.0-49.9, adult    Anxiety    DDD (degenerative disc disease), cervical    Other  orders  -     cyclobenzaprine (FLEXERIL) 10 MG tablet; One p.o. q.h.s. p.r.n. muscle spasm--if necessary can increase to 1 p.o. t.i.d. but will make sleepy  Dispense: 30 tablet; Refill: 5       Main Reason here   MRI of the cervical spine--prolapse disc C5-6 and bulging discs C6-7---pain however appears to be in shoulder GERD--starts in the left occipital area left upper trapezius C7 area infra scapular area and midscapular area--pain with anterior posterior flexion shoulder pain with raising left arm over head--to physical therapy--ultrasonic treatments range of motion exercise Moist heat--see orthopedist Dr. 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  Other medical issues  History of asthma/anxiety brother with history of opioid problem   History asthma doing well  Lab see above  Health maintenance tetanus COVID  A add omeprazole 40 mg for GERD--switch ibuprofen to Mobic 7.5 b.i.d. and Robaxin 750 mg 1 p.o. q.h.s. for muscle spasm  Lab none needed at this time  Health maintenance tetanus COVID

## 2022-09-20 NOTE — PATIENT INSTRUCTIONS
Left shoulder pain--patient needs physical therapy ultrasonic treatments Moist heat range of motion exercise--continue ibuprofen and Flexeril--see Dr. Summers orthopedic--patient wants to discuss possible breast reduction to help with shoulder pain--patient is a  been overall day jos hair--also had MRI showing prolapse disc C5-6 and bulging disc C6-7 but pain appears to be more shoulder pain than disc disease

## 2022-09-27 ENCOUNTER — PATIENT MESSAGE (OUTPATIENT)
Dept: PRIMARY CARE CLINIC | Facility: CLINIC | Age: 32
End: 2022-09-27
Payer: MEDICAID

## 2022-10-03 ENCOUNTER — PATIENT MESSAGE (OUTPATIENT)
Dept: PRIMARY CARE CLINIC | Facility: CLINIC | Age: 32
End: 2022-10-03
Payer: MEDICAID

## 2022-10-04 ENCOUNTER — OFFICE VISIT (OUTPATIENT)
Dept: PRIMARY CARE CLINIC | Facility: CLINIC | Age: 32
End: 2022-10-04
Payer: MEDICAID

## 2022-10-04 VITALS
TEMPERATURE: 98 F | HEART RATE: 77 BPM | RESPIRATION RATE: 18 BRPM | SYSTOLIC BLOOD PRESSURE: 118 MMHG | HEIGHT: 60 IN | WEIGHT: 233.44 LBS | BODY MASS INDEX: 45.83 KG/M2 | OXYGEN SATURATION: 98 % | DIASTOLIC BLOOD PRESSURE: 78 MMHG

## 2022-10-04 DIAGNOSIS — J06.9 URI WITH COUGH AND CONGESTION: Primary | ICD-10-CM

## 2022-10-04 DIAGNOSIS — J45.20 MILD INTERMITTENT ASTHMA WITHOUT COMPLICATION: ICD-10-CM

## 2022-10-04 PROCEDURE — 99214 PR OFFICE/OUTPT VISIT, EST, LEVL IV, 30-39 MIN: ICD-10-PCS | Mod: S$PBB,,, | Performed by: FAMILY MEDICINE

## 2022-10-04 PROCEDURE — 3008F BODY MASS INDEX DOCD: CPT | Mod: CPTII,,, | Performed by: FAMILY MEDICINE

## 2022-10-04 PROCEDURE — 3074F PR MOST RECENT SYSTOLIC BLOOD PRESSURE < 130 MM HG: ICD-10-PCS | Mod: CPTII,,, | Performed by: FAMILY MEDICINE

## 2022-10-04 PROCEDURE — 1159F PR MEDICATION LIST DOCUMENTED IN MEDICAL RECORD: ICD-10-PCS | Mod: CPTII,,, | Performed by: FAMILY MEDICINE

## 2022-10-04 PROCEDURE — 99999 PR PBB SHADOW E&M-EST. PATIENT-LVL III: CPT | Mod: PBBFAC,,, | Performed by: FAMILY MEDICINE

## 2022-10-04 PROCEDURE — 3078F DIAST BP <80 MM HG: CPT | Mod: CPTII,,, | Performed by: FAMILY MEDICINE

## 2022-10-04 PROCEDURE — 3008F PR BODY MASS INDEX (BMI) DOCUMENTED: ICD-10-PCS | Mod: CPTII,,, | Performed by: FAMILY MEDICINE

## 2022-10-04 PROCEDURE — 1159F MED LIST DOCD IN RCRD: CPT | Mod: CPTII,,, | Performed by: FAMILY MEDICINE

## 2022-10-04 PROCEDURE — 1160F PR REVIEW ALL MEDS BY PRESCRIBER/CLIN PHARMACIST DOCUMENTED: ICD-10-PCS | Mod: CPTII,,, | Performed by: FAMILY MEDICINE

## 2022-10-04 PROCEDURE — 99214 OFFICE O/P EST MOD 30 MIN: CPT | Mod: S$PBB,,, | Performed by: FAMILY MEDICINE

## 2022-10-04 PROCEDURE — 99999 PR PBB SHADOW E&M-EST. PATIENT-LVL III: ICD-10-PCS | Mod: PBBFAC,,, | Performed by: FAMILY MEDICINE

## 2022-10-04 PROCEDURE — 3074F SYST BP LT 130 MM HG: CPT | Mod: CPTII,,, | Performed by: FAMILY MEDICINE

## 2022-10-04 PROCEDURE — 3078F PR MOST RECENT DIASTOLIC BLOOD PRESSURE < 80 MM HG: ICD-10-PCS | Mod: CPTII,,, | Performed by: FAMILY MEDICINE

## 2022-10-04 PROCEDURE — 1160F RVW MEDS BY RX/DR IN RCRD: CPT | Mod: CPTII,,, | Performed by: FAMILY MEDICINE

## 2022-10-04 PROCEDURE — 99213 OFFICE O/P EST LOW 20 MIN: CPT | Mod: PBBFAC,PN | Performed by: FAMILY MEDICINE

## 2022-10-04 RX ORDER — ALBUTEROL SULFATE 90 UG/1
2 AEROSOL, METERED RESPIRATORY (INHALATION) EVERY 4 HOURS PRN
Qty: 18 G | Refills: 1 | Status: SHIPPED | OUTPATIENT
Start: 2022-10-04

## 2022-10-04 RX ORDER — BENZONATATE 200 MG/1
200 CAPSULE ORAL 3 TIMES DAILY PRN
Qty: 30 CAPSULE | Refills: 1 | Status: SHIPPED | OUTPATIENT
Start: 2022-10-04

## 2022-10-04 NOTE — PROGRESS NOTES
Subjective:       Patient ID: Rivka Moreau is a 32 y.o. female.    Chief Complaint: Cough (Symptoms started 9/27) and Sore Throat    URI   This is a new problem. The current episode started in the past 7 days (5-6 days ago). The problem has been gradually improving. There has been no fever. Associated symptoms include congestion, coughing, a sore throat and wheezing. Pertinent negatives include no chest pain, diarrhea, plugged ear sensation, rhinorrhea or sinus pain. She has tried nothing for the symptoms.   Review of Systems   Constitutional:  Negative for chills and fever.   HENT:  Positive for congestion and sore throat. Negative for rhinorrhea and sinus pain.    Respiratory:  Positive for cough and wheezing. Negative for shortness of breath.    Cardiovascular:  Negative for chest pain.   Gastrointestinal:  Negative for diarrhea.   Musculoskeletal:  Negative for myalgias.   Allergic/Immunologic: Negative for immunocompromised state.     Objective:      Vitals:    10/04/22 1503   BP: 118/78   BP Location: Left arm   Patient Position: Sitting   BP Method: Large (Manual)   Pulse: 77   Resp: 18   Temp: 97.6 °F (36.4 °C)   TempSrc: Temporal   SpO2: 98%   Weight: 105.9 kg (233 lb 7.5 oz)   Height: 5' (1.524 m)     Physical Exam  Vitals and nursing note reviewed.   Constitutional:       General: She is not in acute distress.     Appearance: Normal appearance. She is well-developed.   HENT:      Head: Normocephalic and atraumatic.   Cardiovascular:      Rate and Rhythm: Normal rate and regular rhythm.      Heart sounds: Normal heart sounds.   Pulmonary:      Effort: Pulmonary effort is normal.      Breath sounds: Normal breath sounds.   Musculoskeletal:      Right lower leg: No edema.      Left lower leg: No edema.   Skin:     General: Skin is warm and dry.   Neurological:      Mental Status: She is alert and oriented to person, place, and time.   Psychiatric:         Mood and Affect: Mood normal.          Behavior: Behavior normal.       Lab Results   Component Value Date    WBC 7.01 03/16/2022    HGB 13.3 03/16/2022    HCT 42.8 03/16/2022     03/16/2022    CHOL 141 03/16/2022    TRIG 58 03/16/2022    HDL 59 03/16/2022    ALT 10 03/16/2022    AST 13 03/16/2022     03/16/2022    K 4.0 03/16/2022     03/16/2022    CREATININE 0.8 03/16/2022    BUN 10 03/16/2022    CO2 25 03/16/2022    TSH 1.283 03/16/2022      Assessment:       1. URI with cough and congestion    2. Mild intermittent asthma without complication          Plan:       URI with cough and congestion  -     benzonatate (TESSALON) 200 MG capsule; Take 1 capsule (200 mg total) by mouth 3 (three) times daily as needed for Cough.  Dispense: 30 capsule; Refill: 1  Likely viral.  Treat symptomatically.  Call or message for any acutely worsening symptoms  Mild intermittent asthma without complication  -     albuterol (PROVENTIL/VENTOLIN HFA) 90 mcg/actuation inhaler; Inhale 2 puffs into the lungs every 4 (four) hours as needed for Wheezing or Shortness of Breath. Rescue  Dispense: 18 g; Refill: 1      Medication List with Changes/Refills   New Medications    ALBUTEROL (PROVENTIL/VENTOLIN HFA) 90 MCG/ACTUATION INHALER    Inhale 2 puffs into the lungs every 4 (four) hours as needed for Wheezing or Shortness of Breath. Rescue    BENZONATATE (TESSALON) 200 MG CAPSULE    Take 1 capsule (200 mg total) by mouth 3 (three) times daily as needed for Cough.   Discontinued Medications    CYCLOBENZAPRINE (FLEXERIL) 10 MG TABLET    One p.o. q.h.s. p.r.n. muscle spasm--if necessary can increase to 1 p.o. t.i.d. but will make sleepy

## 2022-11-07 ENCOUNTER — TELEPHONE (OUTPATIENT)
Dept: ORTHOPEDICS | Facility: CLINIC | Age: 32
End: 2022-11-07
Payer: MEDICAID

## 2022-11-07 DIAGNOSIS — R52 PAIN: Primary | ICD-10-CM

## 2022-12-19 ENCOUNTER — TELEPHONE (OUTPATIENT)
Dept: PRIMARY CARE CLINIC | Facility: CLINIC | Age: 32
End: 2022-12-19
Payer: MEDICAID

## 2022-12-19 ENCOUNTER — PATIENT MESSAGE (OUTPATIENT)
Dept: PRIMARY CARE CLINIC | Facility: CLINIC | Age: 32
End: 2022-12-19
Payer: MEDICAID

## 2022-12-27 RX ORDER — MOXIFLOXACIN 5 MG/ML
1 SOLUTION/ DROPS OPHTHALMIC 3 TIMES DAILY
Qty: 3 ML | Refills: 0 | Status: SHIPPED | OUTPATIENT
Start: 2022-12-27

## 2023-04-23 ENCOUNTER — HOSPITAL ENCOUNTER (EMERGENCY)
Facility: HOSPITAL | Age: 33
Discharge: HOME | End: 2023-04-23
Attending: EMERGENCY MEDICINE
Payer: MEDICAID

## 2023-04-23 VITALS
HEART RATE: 65 BPM | OXYGEN SATURATION: 99 % | RESPIRATION RATE: 17 BRPM | TEMPERATURE: 98 F | SYSTOLIC BLOOD PRESSURE: 117 MMHG | DIASTOLIC BLOOD PRESSURE: 71 MMHG | BODY MASS INDEX: 45.16 KG/M2 | WEIGHT: 230 LBS | HEIGHT: 60 IN

## 2023-04-23 DIAGNOSIS — B02.30 ZOSTER OPHTHALMICUS: Primary | ICD-10-CM

## 2023-04-23 DIAGNOSIS — H57.11 EYE PAIN, RIGHT: ICD-10-CM

## 2023-04-23 LAB
B-HCG UR QL: NEGATIVE
CTP QC/QA: YES

## 2023-04-23 PROCEDURE — 99284 PR EMERGENCY DEPT VISIT,LEVEL IV: ICD-10-PCS | Mod: GC,,, | Performed by: EMERGENCY MEDICINE

## 2023-04-23 PROCEDURE — 99284 EMERGENCY DEPT VISIT MOD MDM: CPT

## 2023-04-23 PROCEDURE — 81025 URINE PREGNANCY TEST: CPT | Performed by: STUDENT IN AN ORGANIZED HEALTH CARE EDUCATION/TRAINING PROGRAM

## 2023-04-23 PROCEDURE — 99284 EMERGENCY DEPT VISIT MOD MDM: CPT | Mod: GC,,, | Performed by: EMERGENCY MEDICINE

## 2023-04-23 PROCEDURE — 25000003 PHARM REV CODE 250: Performed by: STUDENT IN AN ORGANIZED HEALTH CARE EDUCATION/TRAINING PROGRAM

## 2023-04-23 RX ORDER — DIPHENHYDRAMINE HCL 25 MG
1 CAPSULE ORAL
Qty: 30 ML | Refills: 0 | Status: SHIPPED | OUTPATIENT
Start: 2023-04-23

## 2023-04-23 RX ORDER — PROPARACAINE HYDROCHLORIDE 5 MG/ML
1 SOLUTION/ DROPS OPHTHALMIC
Status: COMPLETED | OUTPATIENT
Start: 2023-04-23 | End: 2023-04-23

## 2023-04-23 RX ORDER — VALACYCLOVIR HYDROCHLORIDE 1 G/1
1000 TABLET, FILM COATED ORAL 3 TIMES DAILY
Qty: 21 TABLET | Refills: 0 | Status: SHIPPED | OUTPATIENT
Start: 2023-04-23 | End: 2023-04-30

## 2023-04-23 RX ORDER — IBUPROFEN 600 MG/1
600 TABLET ORAL
Status: COMPLETED | OUTPATIENT
Start: 2023-04-23 | End: 2023-04-23

## 2023-04-23 RX ORDER — KETOROLAC TROMETHAMINE 10 MG/1
10 TABLET, FILM COATED ORAL
Status: DISCONTINUED | OUTPATIENT
Start: 2023-04-23 | End: 2023-04-23

## 2023-04-23 RX ORDER — ERYTHROMYCIN 5 MG/G
OINTMENT OPHTHALMIC 3 TIMES DAILY
Qty: 3.5 G | Refills: 0 | Status: SHIPPED | OUTPATIENT
Start: 2023-04-23 | End: 2023-04-30

## 2023-04-23 RX ADMIN — PROPARACAINE HYDROCHLORIDE 1 DROP: 5 SOLUTION/ DROPS OPHTHALMIC at 07:04

## 2023-04-23 RX ADMIN — IBUPROFEN 600 MG: 600 TABLET, FILM COATED ORAL at 07:04

## 2023-04-23 RX ADMIN — FLUORESCEIN SODIUM 1 EACH: 1 STRIP OPHTHALMIC at 07:04

## 2023-04-23 NOTE — CONSULTS
"Consultation Report  Ophthalmology Service    Date: 04/23/2023    Chief complaint/Reason for Consult: "eye pain"     History of Present Illness: Rivka Moreau is a 33 y.o. female with PMHx of HZO ~10yrs ago and multiple non-ocular shingles flares who presents with 2 days of right periorbital pain/burning. Went to urgent care yesterday and was given Acyclovir 5% cream, which she reports has made the burning/irritation worse. Patient reports associated R eye swelling, blurry vision, photophobia, pain with eye movement, tearing, and right ear fullness. Denies any diplopia, flashes, floaters, or curtains/veils over vision.     POcularHx:   HZO ~10 yrs ago   Refractive error     Current meds:  Acyclovir 5% cream   Valtrex 1000mg BID     Family Hx: Denies family history of glaucoma, macular degeneration, or blindness. family history is not on file.     PMHx:  has a past medical history of Allergy, Anxiety, and Shingles rash.     PSurgHx:  has a past surgical history that includes Eye surgery and Oophorectomy (Left).     Home Medications:   Prior to Admission medications    Medication Sig Start Date End Date Taking? Authorizing Provider   albuterol (PROVENTIL/VENTOLIN HFA) 90 mcg/actuation inhaler Inhale 2 puffs into the lungs every 4 (four) hours as needed for Wheezing or Shortness of Breath. Rescue 10/4/22   Grady Cordero MD   benzonatate (TESSALON) 200 MG capsule Take 1 capsule (200 mg total) by mouth 3 (three) times daily as needed for Cough. 10/4/22   Grady Cordero MD   moxifloxacin (VIGAMOX) 0.5 % ophthalmic solution Place 1 drop into both eyes 3 (three) times daily. 12/27/22   Delta Reyes MD        Medications this encounter:     Allergies: has No Known Allergies.     Social:  reports that she has never smoked. She has never used smokeless tobacco. She reports that she does not drink alcohol and does not use drugs.     ROS: As per HPI    Ocular examination/Dilated fundus examination:  Base Eye " Exam       Visual Acuity (Snellen - Linear)         Right Left    Dist sc 20/30 20/30    Dist ph sc 20/25 20/25              Tonometry (Tonopen, 10:29 AM)         Right Left    Pressure 16 16              Pupils         Pupils Dark Light Shape React APD    Right PERRL 5 3 Round Brisk None    Left PERRL 5 3 Round Brisk None              Visual Fields         Right Left     Full Full              Extraocular Movement         Right Left     Full, Ortho Full, Ortho              Neuro/Psych       Oriented x3: Yes    Mood/Affect: Normal              Dilation       Both eyes: 1% Mydriacyl, 2.5% Phenylephrine @ 9:29 AM                  Slit Lamp and Fundus Exam       External Exam         Right Left    External Few vesicles on R cheek, mild edema Normal              Slit Lamp Exam         Right Left    Lids/Lashes Lower > upper lid edema, few vesicles on lower lid Normal    Conjunctiva/Sclera 2+ injection, diffuse punctate hemorrhages and 1 vesicle on inferior palpebral conj, chemosis mostly temp White and quiet    Cornea PEEs inferonasally and few temporally, no dendritic lesions, stroma clear Clear    Anterior Chamber Deep and quiet Deep and quiet    Iris Round and reactive Round and reactive    Lens Clear Clear    Anterior Vitreous Normal Normal              Fundus Exam         Right Left    Disc Pink & sharp Pink & sharp    Macula Flat Flat    Vessels Normal Normal    Periphery Flat w/o holes/tears/detachment  Flat w/o holes/tears/detachment                       Assessment/Plan:     1. 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, Right eye   - Pt w/ 2 days of R periorbital pain, faint vesicular rash in V2 distribution, blurry vision, irritation, and photophobia   - Base exam: VA 20/30 OU, IOP 16 OU, PERRL no APD, EOMI and CVF grossly full  - External exam showing few vesicles in V2 distribution w/ lower lid erythema and edema, negative umana sign  - Anterior exam with punctate hemorrhages on inf palpebral conj,  injection and chemosis, PEEs on cornea but no evidence of dendritic lesions or other corneal involvement, AC deep and quiet  - DFE unremarkable without evidence of posterior pathology     Recommendations:  - Start Valaciclovir 1000mg PO TID   - Start Erythromycin ointment TID to skin lesions and to right eye  - Start cool compresses TID to right eye   - Will have patient stop topical Acyclovir 5% ointment over the eye as no corneal lesions identified   - Will contact patient to schedule close follow up in outpatient clinic    Patient's Best Contact Number: [phone]     Ted Parker MD  [facility], PGY-2  04/23/2023  10:25 AM

## 2023-04-23 NOTE — ED PROVIDER NOTES
Encounter Date: 4/23/2023       History     Chief Complaint   Patient presents with    Eye Problem     R eye pain started Thurs. Swelling started last night. Pt went to  and was told it was shingles. Pt prescribed with valtrex and eye ointment. Reports blurry vision, R ear pain.      HPI  Review of patient's allergies indicates:  No Known Allergies  Past Medical History:   Diagnosis Date    Allergy     Anxiety     Shingles rash      Past Surgical History:   Procedure Laterality Date    EYE SURGERY      OOPHORECTOMY Left      No family history on file.  Social History     Tobacco Use    Smoking status: Never    Smokeless tobacco: Never   Substance Use Topics    Alcohol use: No    Drug use: No     Review of Systems    Physical Exam     Initial Vitals [04/23/23 0529]   BP Pulse Resp Temp SpO2   125/72 61 19 98 °F (36.7 °C) 98 %      MAP       --         Physical Exam    ED Course   Procedures  Labs Reviewed   POCT URINE PREGNANCY          Imaging Results    None          Medications   fluorescein ophthalmic strip 1 each (1 each Right Eye Given 4/23/23 0704)   proparacaine 0.5 % ophthalmic solution 1 drop (1 drop Right Eye Given 4/23/23 0704)   ibuprofen tablet 600 mg (600 mg Oral Given 4/23/23 0716)                Attending Attestation:   Physician Attestation Statement for Resident:  As the supervising MD   Physician Attestation Statement: I have personally seen and examined this patient.   I agree with the above history.  -:   As the supervising MD I agree with the above PE.     As the supervising MD I agree with the above treatment, course, plan, and disposition.   -: ATTENDING PHYSICIAN ATTESTATION  I have repeated the key portions of the resident's history and physical face to face with the patient, reviewed and agree with the resident medical documentation, and supervised and managed the medical care of the patient.     Right periorbital swelling, pain, vesicles starting on Thursday.  History of herpes zoster  "in that area.  Feels similar prior.  She is been taking acyclovir and acyclovir ointment with no improvement.  She noticed some eye pain and blurry vision yesterday in the right eye so came to the emergency department.  Right eye is with chemosis, no dendrites on the eyeball seen.  Periorbital swelling and redness with vesicles at the lower eyelid.  Concern for herpes ophthalmicus.  Will consult ophthalmology.    Jesus Valiente MD  Department of Emergency Medicine                                  Clinical Impression:    ***Please document a Clinical Impression and click the "Refresh" button to refresh your note and automatically pull in before signing.***         "

## 2023-04-23 NOTE — DISCHARGE INSTRUCTIONS
Thank you for visiting us today!    Please follow-up with ophthalmology as soon as possible.  Please remember to take your erythromycin, Valtrex and artificial tears as prescribed.

## 2023-04-23 NOTE — Clinical Note
"Rivka"Vicki Moreau was seen and treated in our emergency department on 4/23/2023.  She may return to work on 04/28/2023.       If you have any questions or concerns, please don't hesitate to call.      Deepthi Boyce MD"

## 2023-04-23 NOTE — ED PROVIDER NOTES
Encounter Date: 4/23/2023       History     Chief Complaint   Patient presents with    Eye Problem     R eye pain started Thurs. Swelling started last night. Pt went to  and was told it was shingles. Pt prescribed with valtrex and eye ointment. Reports blurry vision, R ear pain.      Ms. Moreau is a previously healthy 33-year-old female who presents to the emergency department due to right eye pain.  Patient states that about 10 years ago she was diagnosed with shingles of the eye, she states that at that time she was having pain in her right eye and there were blisters around her eye. She was put on Valtrex for 2 weeks after which it completely resolved. She states that over the past 5 years she has had multiple flares where she has taken Valtrex and it resolved.  Patient states that 2 days ago she woke up with pain and burning around her right eye she was concerned she was having another flare and started taking Valtrex. She states that yesterday her eye did not appear to get better and so she had gone to an urgent care where she was given acyclovir cream to rub around her eye.  Patient states that she did that but the pain only increased, she then states that since this morning she has noticed that she has had blurry vision pain with eye movement and worsening burning around her eye.  She was concerned that her infection was worse and wanted to come to the emergency department for evaluation    The history is provided by the patient.   Review of patient's allergies indicates:  No Known Allergies  Past Medical History:   Diagnosis Date    Allergy     Anxiety     Shingles rash      Past Surgical History:   Procedure Laterality Date    EYE SURGERY      OOPHORECTOMY Left      No family history on file.  Social History     Tobacco Use    Smoking status: Never    Smokeless tobacco: Never   Substance Use Topics    Alcohol use: No    Drug use: No     Review of Systems   Constitutional:  Negative for chills, diaphoresis,  fatigue and fever.   HENT:  Negative for congestion, rhinorrhea and sore throat.    Eyes:  Positive for photophobia, pain, redness and visual disturbance.   Respiratory:  Negative for cough, chest tightness and shortness of breath.    Cardiovascular:  Negative for chest pain.   Gastrointestinal:  Negative for abdominal pain, blood in stool, constipation, diarrhea and vomiting.   Genitourinary:  Negative for dysuria, hematuria and urgency.   Musculoskeletal:  Negative for back pain.   Skin:  Negative for rash.   Neurological:  Negative for seizures and syncope.   Hematological:  Does not bruise/bleed easily.   Psychiatric/Behavioral:  Negative for agitation and hallucinations.      Physical Exam     Initial Vitals [04/23/23 0529]   BP Pulse Resp Temp SpO2   125/72 61 19 98 °F (36.7 °C) 98 %      MAP       --         Physical Exam     Nursing note and vitals reviewed.  Constitutional: Patient appears well-developed and well-nourished. No distress. AxOx3, NAD, well nourished, appears stated age  HENT:   Head: Normocephalic and atraumatic.   Eyes:  Erythema and chemosis noted to right eye, right eye is leaking clear fluid, presence of what appears to be vesicles on right lower eyelid.  Normal-appearing left eye.  Pupils are equal and reactive  Redness extending from right eye to right cheek  Neck: Neck supple. no stridor, no masses, no drooling or voice changes  Normal range of motion.  Cardiovascular: Normal rate, regular rhythm, normal heart sounds and intact distal pulses. no m/r/g  Pulmonary/Chest: Breath sounds normal. CTAB, no wheezes, rales or rhonchi, no increased work of breathing  Abdominal: Abdomen is soft. Patient exhibits no distension. There is no abdominal tenderness. no organomegaly, no CVAT  Musculoskeletal:      Cervical back: Normal range of motion and neck supple.   Neurological: Patient is alert and oriented to person, place, and time. No cranial nerve deficit. GCS score is 15. Moving all  extremities, face grossly symmetric  Skin: Skin is warm and dry.  Ext: no edema, no lesions, rashes, or deformity  Psych: Normal mood/affect,cooperative, well groomed, makes good eye contact        ED Course   Procedures  Labs Reviewed   POCT URINE PREGNANCY          Imaging Results    None          Medications   fluorescein ophthalmic strip 1 each (1 each Right Eye Given 4/23/23 0704)   proparacaine 0.5 % ophthalmic solution 1 drop (1 drop Right Eye Given 4/23/23 0704)   ibuprofen tablet 600 mg (600 mg Oral Given 4/23/23 0716)     Medical Decision Making:   Initial Assessment:   Ms. Moreau is a previously healthy 33-year-old female who presents to the emergency department due to right eye pain.  Differential Diagnosis:   Herpes zoster ophthalmicus  Herpes zoster oticus  Corneal abrasion  Conjunctivitis  Clinical Tests:   Lab Tests: Ordered and Reviewed  ED Management:  Patient was thoroughly examined,  Visual acuity exam was done which showed a significantly decreased acuity in her right eye  Fluorescein exam was done which did not show any dendrites or corneal abrasion  Given that patient had vesicles near her I was concerned for herpes zoster ophthalmicus  Discussion was had with ophthalmology who evaluated the patient they stated that patient should be put on valacyclovir 3 times a day as well as erythromycin and artificial eyedrops  Plan was for Ophthalmology to set up outpatient follow-up for the patient as soon as possible  I felt confident discharging the patient and she had been evaluated by ophthalmology.           Attending Attestation:   Physician Attestation Statement for Resident:  As the supervising MD   Physician Attestation Statement: I have personally seen and examined this patient.   I agree with the above history.  -:   As the supervising MD I agree with the above PE.     As the supervising MD I agree with the above treatment, course, plan, and disposition.   -: Right periorbital swelling, pain,  vesicles starting on Thursday.  History of herpes zoster in that area.  Feels similar prior.  She is been taking acyclovir and acyclovir ointment with no improvement.  She noticed some eye pain and blurry vision yesterday in the right eye so came to the emergency department.  Right eye is with chemosis, no dendrites on the eyeball seen.  Periorbital swelling and redness with vesicles at the lower eyelid.  Concern for herpes ophthalmicus.  Will consult ophthalmology.                                 Clinical Impression:   Final diagnoses:  [B02.30] Zoster ophthalmicus (Primary)  [H57.11] Eye pain, right        ED Disposition Condition    Discharge Stable          ED Prescriptions       Medication Sig Dispense Start Date End Date Auth. Provider    valACYclovir (VALTREX) 1000 MG tablet Take 1 tablet (1,000 mg total) by mouth 3 (three) times daily. for 7 days 21 tablet 4/23/2023 4/30/2023 Deepthi Boyce MD    erythromycin (ROMYCIN) ophthalmic ointment Place into the right eye 3 (three) times daily. for 7 days 3.5 g 4/23/2023 4/30/2023 Deepthi Boyce MD    dextran 70-hypromellose (ARTIFICIAL TEARS,[id number],) ophthalmic solution Place 1 drop into the right eye as needed. 30 mL 4/23/2023 -- Deepthi Boyce MD          Follow-up Information       Follow up With Specialties Details Why Contact Info Additional Information    Delta Reyes MD Family Medicine Schedule an appointment as soon as possible for a visit in 3 days  [address] [place] [address] [phone]       [address] Ophthalmology Schedule an appointment as soon as possible for a visit in 3 days  [address] [facility] [address] [phone] Please arrive on the 10th floor for check-in.             Deepthi Boyce MD  Resident  04/23/23 7017

## 2023-04-24 ENCOUNTER — TELEPHONE (OUTPATIENT)
Dept: OPHTHALMOLOGY | Facility: CLINIC | Age: 33
End: 2023-04-24
Payer: MEDICAID

## 2023-04-24 NOTE — TELEPHONE ENCOUNTER
----- Message from Ted Parker MD sent at 4/23/2023 12:59 PM CDT -----  Regarding: Follow up  Hi,    Can we please get this patient follow up in triage clinic on Tuesday or Wednesday this week for close follow up of herpes zoster with conjunctival involvement.    Thanks!    Patient's Best Contact Number: [phone]     Ted Parker MD  [facility], PGY-2  Pager: [phone]

## 2023-04-25 ENCOUNTER — OFFICE VISIT (OUTPATIENT)
Dept: OPHTHALMOLOGY | Facility: CLINIC | Age: 33
End: 2023-04-25
Payer: MEDICAID

## 2023-04-25 DIAGNOSIS — B00.59 HERPES SIMPLEX DERMATITIS OF EYELID: Primary | ICD-10-CM

## 2023-04-25 PROCEDURE — 99212 OFFICE O/P EST SF 10 MIN: CPT | Mod: PBBFAC | Performed by: OPHTHALMOLOGY

## 2023-04-25 PROCEDURE — 92014 COMPRE OPH EXAM EST PT 1/>: CPT | Mod: S$PBB,,, | Performed by: OPHTHALMOLOGY

## 2023-04-25 PROCEDURE — 92014 PR EYE EXAM, EST PATIENT,COMPREHESV: ICD-10-PCS | Mod: S$PBB,,, | Performed by: OPHTHALMOLOGY

## 2023-04-25 PROCEDURE — 1160F RVW MEDS BY RX/DR IN RCRD: CPT | Mod: CPTII,,, | Performed by: OPHTHALMOLOGY

## 2023-04-25 PROCEDURE — 99999 PR PBB SHADOW E&M-EST. PATIENT-LVL II: ICD-10-PCS | Mod: PBBFAC,,, | Performed by: OPHTHALMOLOGY

## 2023-04-25 PROCEDURE — 1159F PR MEDICATION LIST DOCUMENTED IN MEDICAL RECORD: ICD-10-PCS | Mod: CPTII,,, | Performed by: OPHTHALMOLOGY

## 2023-04-25 PROCEDURE — 1160F PR REVIEW ALL MEDS BY PRESCRIBER/CLIN PHARMACIST DOCUMENTED: ICD-10-PCS | Mod: CPTII,,, | Performed by: OPHTHALMOLOGY

## 2023-04-25 PROCEDURE — 1159F MED LIST DOCD IN RCRD: CPT | Mod: CPTII,,, | Performed by: OPHTHALMOLOGY

## 2023-04-25 PROCEDURE — 99999 PR PBB SHADOW E&M-EST. PATIENT-LVL II: CPT | Mod: PBBFAC,,, | Performed by: OPHTHALMOLOGY

## 2023-04-25 RX ORDER — VALACYCLOVIR HYDROCHLORIDE 500 MG/1
500 TABLET, FILM COATED ORAL DAILY
Qty: 30 TABLET | Refills: 6 | Status: SHIPPED | OUTPATIENT
Start: 2023-04-25 | End: 2023-05-25

## 2023-04-25 NOTE — PROGRESS NOTES
HPI    32 y/o is here for HZV OD.  Blisters started to appear on Friday RLL.  On   Saturday OD began to swell and the swelling was worse on Sunday.  She went   to Urgent care and then the ER.  She is taking Valtrex 1000 mg TID po and   using Acyclovir ointment TID OD.    +6 pain  Last edited by Geri Gillis on 4/25/2023 12:02 PM.            Assessment /Plan     For exam results, see Encounter Report.    Herpes simplex dermatitis of eyelid      No corneal involvement  RLL lesions healed  K clear    Cont Valtrex

## 2023-08-24 ENCOUNTER — TELEPHONE (OUTPATIENT)
Dept: PRIMARY CARE CLINIC | Facility: CLINIC | Age: 33
End: 2023-08-24
Payer: MEDICAID

## 2023-08-24 NOTE — TELEPHONE ENCOUNTER
----- Message from Glenis Hamilton sent at 8/24/2023  9:07 AM CDT -----  Contact: [phone]  [email or web address]     Patient is calling for Medical Advice regarding: missed annual appt     How long has patient had these symptoms:yesterday     Pharmacy name and phone#:  [id number] - [place] - [address] [facility]  [address] [place] [address]  Phone: [phone] Fax: [phone]       Would like response via Anavexhart:  no     Comments:pt missed her annual appt yesterday and is asking if she can have labs done to check her diabetes please give return call

## 2023-08-24 NOTE — TELEPHONE ENCOUNTER
Called patient regarding message, patient said that she needed to an annual appointment. Patient was schedule for 09/06.

## 2023-09-18 ENCOUNTER — PATIENT MESSAGE (OUTPATIENT)
Dept: PRIMARY CARE CLINIC | Facility: CLINIC | Age: 33
End: 2023-09-18
Payer: MEDICAID

## 2023-10-18 ENCOUNTER — PATIENT MESSAGE (OUTPATIENT)
Dept: CARDIOLOGY | Facility: CLINIC | Age: 33
End: 2023-10-18
Payer: MEDICAID

## 2024-03-13 ENCOUNTER — OFFICE VISIT (OUTPATIENT)
Dept: OPHTHALMOLOGY | Facility: CLINIC | Age: 34
End: 2024-03-13
Attending: OPHTHALMOLOGY
Payer: MEDICAID

## 2024-03-13 DIAGNOSIS — H00.014 HORDEOLUM EXTERNUM OF LEFT UPPER EYELID: Primary | ICD-10-CM

## 2024-03-13 PROCEDURE — 1160F RVW MEDS BY RX/DR IN RCRD: CPT | Mod: CPTII,,, | Performed by: OPHTHALMOLOGY

## 2024-03-13 PROCEDURE — 99212 OFFICE O/P EST SF 10 MIN: CPT | Mod: PBBFAC | Performed by: OPHTHALMOLOGY

## 2024-03-13 PROCEDURE — 92014 COMPRE OPH EXAM EST PT 1/>: CPT | Mod: S$PBB,,, | Performed by: OPHTHALMOLOGY

## 2024-03-13 PROCEDURE — 99999 PR PBB SHADOW E&M-EST. PATIENT-LVL II: CPT | Mod: PBBFAC,,, | Performed by: OPHTHALMOLOGY

## 2024-03-13 PROCEDURE — 1159F MED LIST DOCD IN RCRD: CPT | Mod: CPTII,,, | Performed by: OPHTHALMOLOGY

## 2024-03-13 NOTE — PROGRESS NOTES
HPI    Patient present today for eye problem   Pt state whole left side is in pain   Feels like ear ache. Swollen eyelids RUL, CARTER   Drainage green discharge   Last edited by Bao Jeffery MD on 3/13/2024  1:48 PM.            Assessment /Plan     For exam results, see Encounter Report.    Hordeolum externum of left upper eyelid      Hx of HSV dermatitis, but today look like a Stye CARTER  Reassured  WC, Emycin elizabeth

## 2024-03-15 ENCOUNTER — OFFICE VISIT (OUTPATIENT)
Dept: OPHTHALMOLOGY | Facility: CLINIC | Age: 34
End: 2024-03-15
Payer: MEDICAID

## 2024-03-15 DIAGNOSIS — B00.59 HERPES SIMPLEX DERMATITIS OF EYELID: Primary | ICD-10-CM

## 2024-03-15 PROCEDURE — 1160F RVW MEDS BY RX/DR IN RCRD: CPT | Mod: CPTII,,, | Performed by: OPHTHALMOLOGY

## 2024-03-15 PROCEDURE — 1159F MED LIST DOCD IN RCRD: CPT | Mod: CPTII,,, | Performed by: OPHTHALMOLOGY

## 2024-03-15 PROCEDURE — 99999 PR PBB SHADOW E&M-EST. PATIENT-LVL II: CPT | Mod: PBBFAC,,, | Performed by: OPHTHALMOLOGY

## 2024-03-15 PROCEDURE — 99212 OFFICE O/P EST SF 10 MIN: CPT | Mod: PBBFAC | Performed by: OPHTHALMOLOGY

## 2024-03-15 PROCEDURE — 92014 COMPRE OPH EXAM EST PT 1/>: CPT | Mod: S$PBB,,, | Performed by: OPHTHALMOLOGY

## 2024-03-15 RX ORDER — VALACYCLOVIR HYDROCHLORIDE 1 G/1
1000 TABLET, FILM COATED ORAL 3 TIMES DAILY
Qty: 30 TABLET | Refills: 1 | Status: SHIPPED | OUTPATIENT
Start: 2024-03-15 | End: 2024-03-25

## 2024-03-15 NOTE — PROGRESS NOTES
HPI     2 DAY  FOLLOW UP   OS  UPPERLID            Comments: FOLLOW UP 2 DAY UPPER LID  OS   HORDEOLUM / HERPES  SIMPLEX  Blisters  are back patient    swollen  lid   os painful   Pt is sensitive to light  wearing sunglasses  all day   Headaches   Pt taking  valacyclovir   p o tid     almost out of Rx  ??  Wanting to   know if  elizabeth or ggts may help in os  ?          Comments    Patient present today for eye problem   Pt state whole left side is in pain   Feels like ear ache. Swollen eyelids RUL, CARTER   Drainage green discharge           Last edited by Delmi Zamora on 3/15/2024 11:19 AM.            Assessment /Plan     For exam results, see Encounter Report.    Herpes simplex dermatitis of eyelid      Symptoms c/w HZO L side   Eyelid swelling and injection  Valtrex 1gm tid

## 2024-04-11 ENCOUNTER — PATIENT OUTREACH (OUTPATIENT)
Dept: ADMINISTRATIVE | Facility: HOSPITAL | Age: 34
End: 2024-04-11
Payer: MEDICAID

## 2024-04-11 NOTE — PROGRESS NOTES
Health Maintenance Due   Topic Date Due    Pneumococcal Vaccines (Age 0-64) (1 of 2 - PCV) Never done    TETANUS VACCINE  09/17/2017    Influenza Vaccine (1) Never done    COVID-19 Vaccine (3 - 2023-24 season) 09/01/2023     Immunizations - reviewed and updated   Care Everywhere - triggered   Care Teams -   Outreach - [facility] panel list reviewed. Patient due for annual visit with PCP. Patient last seen 10/4/2022. Portal message sent in regards to scheduling

## 2025-01-13 ENCOUNTER — OFFICE VISIT (OUTPATIENT)
Dept: PRIMARY CARE CLINIC | Facility: CLINIC | Age: 35
End: 2025-01-13
Payer: MEDICAID

## 2025-01-13 VITALS
BODY MASS INDEX: 47.04 KG/M2 | HEIGHT: 60 IN | WEIGHT: 239.63 LBS | HEART RATE: 63 BPM | DIASTOLIC BLOOD PRESSURE: 64 MMHG | OXYGEN SATURATION: 99 % | RESPIRATION RATE: 19 BRPM | SYSTOLIC BLOOD PRESSURE: 110 MMHG

## 2025-01-13 DIAGNOSIS — R00.2 PALPITATIONS: ICD-10-CM

## 2025-01-13 DIAGNOSIS — L30.9 DERMATITIS: ICD-10-CM

## 2025-01-13 DIAGNOSIS — Z00.00 ANNUAL PHYSICAL EXAM: Primary | ICD-10-CM

## 2025-01-13 DIAGNOSIS — Z12.11 COLON CANCER SCREENING: ICD-10-CM

## 2025-01-13 PROCEDURE — 99999 PR PBB SHADOW E&M-EST. PATIENT-LVL IV: CPT | Mod: PBBFAC,,,

## 2025-01-13 PROCEDURE — 3078F DIAST BP <80 MM HG: CPT | Mod: CPTII,,,

## 2025-01-13 PROCEDURE — 93005 ELECTROCARDIOGRAM TRACING: CPT | Mod: PBBFAC,PN | Performed by: INTERNAL MEDICINE

## 2025-01-13 PROCEDURE — 99214 OFFICE O/P EST MOD 30 MIN: CPT | Mod: PBBFAC,PN

## 2025-01-13 PROCEDURE — 1159F MED LIST DOCD IN RCRD: CPT | Mod: CPTII,,,

## 2025-01-13 PROCEDURE — 3074F SYST BP LT 130 MM HG: CPT | Mod: CPTII,,,

## 2025-01-13 PROCEDURE — G2211 COMPLEX E/M VISIT ADD ON: HCPCS | Mod: S$PBB,,,

## 2025-01-13 PROCEDURE — 1160F RVW MEDS BY RX/DR IN RCRD: CPT | Mod: CPTII,,,

## 2025-01-13 PROCEDURE — 93010 ELECTROCARDIOGRAM REPORT: CPT | Mod: S$PBB,,, | Performed by: INTERNAL MEDICINE

## 2025-01-13 PROCEDURE — 99214 OFFICE O/P EST MOD 30 MIN: CPT | Mod: S$PBB,,,

## 2025-01-13 PROCEDURE — 87591 N.GONORRHOEAE DNA AMP PROB: CPT

## 2025-01-13 PROCEDURE — 3008F BODY MASS INDEX DOCD: CPT | Mod: CPTII,,,

## 2025-01-13 RX ORDER — DOXYCYCLINE HYCLATE 100 MG
100 TABLET ORAL 2 TIMES DAILY
COMMUNITY
Start: 2025-01-08

## 2025-01-13 RX ORDER — TACROLIMUS 1 MG/G
0.1 OINTMENT TOPICAL 2 TIMES DAILY
COMMUNITY
Start: 2025-01-08

## 2025-01-13 NOTE — PROGRESS NOTES
Assessment:       1. Annual physical exam    2. Palpitations    3. Dermatitis    4. Colon cancer screening       Plan:       Annual physical exam  -     CBC Auto Differential; Future; Expected date: 01/13/2025  -     Comprehensive Metabolic Panel; Future; Expected date: 01/13/2025  -     Lipid Panel; Future; Expected date: 01/13/2025  -     Hemoglobin A1C; Future; Expected date: 01/13/2025  -     TSH; Future; Expected date: 01/13/2025  -     HIV 1/2 Ag/Ab (4th Gen); Future; Expected date: 01/13/2025  -     Treponema Pallidium Antibodies IgG, IgM; Future; Expected date: 01/13/2025  -     C. trachomatis/N. gonorrhoeae by AMP DNA [facility]; Urine    Palpitations  -     IN OFFICE EKG 12-LEAD (to Muse)    Dermatitis  -     C-reactive protein; Future; Expected date: 01/13/2025  -     Sedimentation rate; Future; Expected date: 01/13/2025  -     PURA Screen w/Reflex; Future; Expected date: 01/13/2025  -     RHEUMATOID FACTOR; Future; Expected date: 01/13/2025    Colon cancer screening  -     Cancel: Ambulatory referral/consult to Endo Procedure ; Future; Expected date: 01/14/2025  -     Cologuard Screening (Multitarget Stool DNA); Future; Expected date: 01/13/2025    Assessment & Plan    - Evaluated facial rash, likely contact dermatitis or rosacea from heated exercise classes  - Ordered lupus panel to rule out lupus due to rash location  - Considered EKG due to patient's report of palpitations with certain positioning  - Recommend colonoscopy over Cologuard screening due to family history of colon cancer and after consulting with Dr. Gutiérrez-pt prefers to defer colonoscopy at this time and request cologuard test.     Facial Rash:  - Evaluated the patient's facial rash, which worsened over 3 months of attending heated gym classes.  - Observed the rash and noted it resembles a heat rash or dermatitis, possibly a form of eczema.  - Considered possible diagnoses of contact dermatitis, rosacea, or other uncertain  conditions.  - Noted improvement in the rash with current treatment.  - Continued doxycycline antibiotic for 2 weeks as prescribed by Dr. Browne.  - Continued topical ointment application as prescribed by Dr. Browne.  - Documented the rash with photographs for comparison, dated 12-31-24 and current date.    Lupus evaluation:  - Ordered labs to rule out lupus due to the location of the rash.  - Ordered lupus panel.    Palpitations:  - Noted patient's report of experiencing palpitations before menstruation and when sitting in certain positions.  - Ordered an electrocardiogram (EKG) to evaluate reported palpitations.    LABS:  - Ordered fasting comprehensive metabolic panel, including kidney, liver, and thyroid function tests.  - Ordered lipid panel.  - Ordered hemoglobin A1C.  - Return to lab in the morning for fasting labs.    FOLLOW UP:  - Check patient portal for lab results and provider's interpretation.  - Contact office with any questions about lab results or if symptoms worsen.         Subjective:    Patient ID: Rivka Moreau is a 34 y.o. female.  Chief Complaint: Annual Exam and Rash    Rash  Pertinent negatives include no cough, fever or shortness of breath.     History of Present Illness    Ms. Moreau presents for an annual wellness visit and to discuss a facial rash.  Ms. Moreau reports a facial rash believed to be contracted from attending heated gym classes involving sauna and Pilates for 3 months, during which time the rash progressively worsened. Dr. Browne saw the patient last Wednesday and prescribed doxycycline and a topical ointment. Ms. Moreau has been taking the antibiotic for 5 days (Thursday through Monday) and reports nausea as a side effect. The topical ointment causes a sensation similar to a chemical peel on her skin. Ms. Moreau describes her skin as normally smooth without texture, but the rash has caused bumps and altered texture. Dr. Browne suggested possible diagnoses of  contact dermatitis or rosacea, but was uncertain. He also wanted to rule out lupus due to the location of the rash.  Ms. Moreau denies any GI issues, chest pain, shortness of breath, or dizziness. She also denies having lupus or any other symptoms besides the facial rash.      Review of Systems   Constitutional:  Negative for activity change, appetite change and fever.   HENT: Negative.     Respiratory:  Negative for cough, chest tightness and shortness of breath.    Cardiovascular:  Positive for palpitations. Negative for chest pain.   Gastrointestinal: Negative.    Genitourinary: Negative.    Musculoskeletal: Negative.    Skin:  Positive for rash.   Neurological: Negative.    Psychiatric/Behavioral: Negative.         Objective:      Vitals:    01/13/25 1443   BP: 110/64   BP Location: Left arm   Patient Position: Sitting   Pulse: 63   Resp: 19   SpO2: 99%   Weight: 108.7 kg (239 lb 10.2 oz)   Height: 5' (1.524 m)     BP Readings from Last 5 Encounters:   01/13/25 110/64   04/23/23 117/71   10/04/22 118/78   09/20/22 100/72   06/23/22 102/74     Wt Readings from Last 5 Encounters:   01/13/25 108.7 kg (239 lb 10.2 oz)   04/23/23 104.3 kg (230 lb)   10/04/22 105.9 kg (233 lb 7.5 oz)   09/20/22 104.9 kg (231 lb 4.2 oz)   06/20/22 105.3 kg (232 lb 4.1 oz)     Physical Exam  Constitutional:       General: She is not in acute distress.     Appearance: Normal appearance.   HENT:      Head: Normocephalic and atraumatic.      Right Ear: Tympanic membrane normal.      Left Ear: Tympanic membrane normal.      Mouth/Throat:      Mouth: Mucous membranes are moist.   Eyes:      Extraocular Movements: Extraocular movements intact.      Pupils: Pupils are equal, round, and reactive to light.   Cardiovascular:      Rate and Rhythm: Normal rate and regular rhythm.      Pulses: Normal pulses.      Heart sounds: Normal heart sounds. No murmur heard.     No friction rub. No gallop.   Pulmonary:      Effort: Pulmonary effort is normal.       Breath sounds: Normal breath sounds.   Abdominal:      General: Abdomen is flat. There is no distension.      Palpations: Abdomen is soft.   Musculoskeletal:         General: Normal range of motion.      Cervical back: Normal range of motion.   Skin:     General: Skin is warm and dry.      Capillary Refill: Capillary refill takes less than 2 seconds.   Neurological:      General: No focal deficit present.      Mental Status: She is alert and oriented to person, place, and time.   Psychiatric:         Mood and Affect: Mood normal.     Physical Exam           Lab Results   Component Value Date    WBC 7.01 03/16/2022    HGB 13.3 03/16/2022    HCT 42.8 03/16/2022     03/16/2022    CHOL 141 03/16/2022    TRIG 58 03/16/2022    HDL 59 03/16/2022    ALT 10 03/16/2022    AST 13 03/16/2022     03/16/2022    K 4.0 03/16/2022     03/16/2022    CREATININE 0.8 03/16/2022    BUN 10 03/16/2022    CO2 25 03/16/2022    TSH 1.283 03/16/2022      This note was generated with the assistance of ambient listening technology. Verbal consent was obtained by the patient and accompanying visitor(s) for the recording of patient appointment to facilitate this note. I attest to having reviewed and edited the generated note for accuracy, though some syntax or spelling errors may persist. Please contact the author of this note for any clarification.    TONY Lin, BIRD-C

## 2025-01-14 LAB
OHS QRS DURATION: 96 MS
OHS QTC CALCULATION: 403 MS

## 2025-01-15 ENCOUNTER — PATIENT MESSAGE (OUTPATIENT)
Dept: PRIMARY CARE CLINIC | Facility: CLINIC | Age: 35
End: 2025-01-15

## 2025-01-15 LAB
C TRACH DNA SPEC QL NAA+PROBE: NOT DETECTED
N GONORRHOEA DNA SPEC QL NAA+PROBE: NOT DETECTED

## 2025-01-16 ENCOUNTER — PATIENT MESSAGE (OUTPATIENT)
Dept: PRIMARY CARE CLINIC | Facility: CLINIC | Age: 35
End: 2025-01-16
Payer: MEDICAID

## 2025-01-16 DIAGNOSIS — R70.0 ELEVATED SED RATE: Primary | ICD-10-CM
